# Patient Record
Sex: MALE | Race: WHITE | Employment: FULL TIME | URBAN - METROPOLITAN AREA
[De-identification: names, ages, dates, MRNs, and addresses within clinical notes are randomized per-mention and may not be internally consistent; named-entity substitution may affect disease eponyms.]

---

## 2018-06-13 PROBLEM — T39.1X1A OVERDOSE BY ACETAMINOPHEN: Status: ACTIVE | Noted: 2018-06-13

## 2018-11-13 ENCOUNTER — OFFICE VISIT (OUTPATIENT)
Dept: FAMILY MEDICINE CLINIC | Age: 53
End: 2018-11-13

## 2018-11-13 VITALS
HEART RATE: 85 BPM | WEIGHT: 188 LBS | TEMPERATURE: 98.1 F | DIASTOLIC BLOOD PRESSURE: 92 MMHG | BODY MASS INDEX: 24.13 KG/M2 | SYSTOLIC BLOOD PRESSURE: 154 MMHG | OXYGEN SATURATION: 97 % | RESPIRATION RATE: 18 BRPM | HEIGHT: 74 IN

## 2018-11-13 DIAGNOSIS — M54.42 CHRONIC BILATERAL LOW BACK PAIN WITH LEFT-SIDED SCIATICA: ICD-10-CM

## 2018-11-13 DIAGNOSIS — I10 ESSENTIAL HYPERTENSION: ICD-10-CM

## 2018-11-13 DIAGNOSIS — R53.82 CHRONIC FATIGUE: ICD-10-CM

## 2018-11-13 DIAGNOSIS — M51.26 LUMBAGO DUE TO DISPLACEMENT OF INTERVERTEBRAL DISC: Primary | ICD-10-CM

## 2018-11-13 DIAGNOSIS — G89.29 CHRONIC BILATERAL LOW BACK PAIN WITH LEFT-SIDED SCIATICA: ICD-10-CM

## 2018-11-13 RX ORDER — QUETIAPINE FUMARATE 25 MG/1
1 TABLET, FILM COATED ORAL DAILY
Refills: 1 | COMMUNITY
Start: 2018-11-08

## 2018-11-13 RX ORDER — METHYLPREDNISOLONE 4 MG/1
TABLET ORAL
Qty: 1 DOSE PACK | Refills: 0 | Status: SHIPPED | OUTPATIENT
Start: 2018-11-13

## 2018-11-13 RX ORDER — BUPROPION HYDROCHLORIDE 300 MG/1
1 TABLET ORAL DAILY
Refills: 0 | COMMUNITY
Start: 2018-10-18

## 2018-11-13 RX ORDER — BUSPIRONE HYDROCHLORIDE 15 MG/1
1 TABLET ORAL DAILY
Refills: 4 | COMMUNITY
Start: 2018-11-07

## 2018-11-13 RX ORDER — NAPROXEN 500 MG/1
500 TABLET ORAL 2 TIMES DAILY WITH MEALS
Qty: 30 TAB | Refills: 0 | Status: SHIPPED | OUTPATIENT
Start: 2018-11-13

## 2018-11-13 RX ORDER — MIRTAZAPINE 30 MG/1
1 TABLET, FILM COATED ORAL DAILY
Refills: 4 | COMMUNITY
Start: 2018-11-07

## 2018-11-13 RX ORDER — SERTRALINE HYDROCHLORIDE 100 MG/1
1 TABLET, FILM COATED ORAL DAILY
Refills: 2 | COMMUNITY
Start: 2018-10-01

## 2018-11-13 NOTE — PATIENT INSTRUCTIONS
Learning About Relief for Back Pain What is back tension and strain? Back strain happens when you overstretch, or pull, a muscle in your back. You may hurt your back in an accident or when you exercise or lift something. Most back pain will get better with rest and time. You can take care of yourself at home to help your back heal. 
What can you do first to relieve back pain? When you first feel back pain, try these steps: 
· Walk. Take a short walk (10 to 20 minutes) on a level surface (no slopes, hills, or stairs) every 2 to 3 hours. Walk only distances you can manage without pain, especially leg pain. · Relax. Find a comfortable position for rest. Some people are comfortable on the floor or a medium-firm bed with a small pillow under their head and another under their knees. Some people prefer to lie on their side with a pillow between their knees. Don't stay in one position for too long. · Try heat or ice. Try using a heating pad on a low or medium setting, or take a warm shower, for 15 to 20 minutes every 2 to 3 hours. Or you can buy single-use heat wraps that last up to 8 hours. You can also try an ice pack for 10 to 15 minutes every 2 to 3 hours. You can use an ice pack or a bag of frozen vegetables wrapped in a thin towel. There is not strong evidence that either heat or ice will help, but you can try them to see if they help. You may also want to try switching between heat and cold. · Take pain medicine exactly as directed. ? If the doctor gave you a prescription medicine for pain, take it as prescribed. ? If you are not taking a prescription pain medicine, ask your doctor if you can take an over-the-counter medicine. What else can you do? · Stretch and exercise. Exercises that increase flexibility may relieve your pain and make it easier for your muscles to keep your spine in a good, neutral position. And don't forget to keep walking. · Do self-massage. You can use self-massage to unwind after work or school or to energize yourself in the morning. You can easily massage your feet, hands, or neck. Self-massage works best if you are in comfortable clothes and are sitting or lying in a comfortable position. Use oil or lotion to massage bare skin. · Reduce stress. Back pain can lead to a vicious [place]: Distress about the pain tenses the muscles in your back, which in turn causes more pain. Learn how to relax your mind and your muscles to lower your stress. Where can you learn more? Go to http://arnulfo-renetta.info/. Enter N892 in the search box to learn more about \"Learning About Relief for Back Pain. \" Current as of: November 29, 2017 Content Version: 11.8 © 5176-2682 [employer]. Care instructions adapted under license by [employer] (which disclaims liability or warranty for this information). If you have questions about a medical condition or this instruction, always ask your healthcare professional. [address] any warranty or liability for your use of this information.
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
## 2018-11-13 NOTE — PROGRESS NOTES
{[facility] Summary:[id number]} Key Psychotherapeutic Meds   
    
  
 traZODone (DESYREL) 150 mg tablet Take 150 mg by mouth nightly. sertraline (ZOLOFT) 50 mg tablet Take 1 Tab by mouth daily. Other [address] [place] [address] The patient is on no other behavioral health meds. Lab Results Component Value Date/Time  Sodium 142 10/01/2018 02:25 PM  
 Creatinine 0.9 10/01/2018 02:25 PM  
 WBC 6.6 10/01/2018 02:25 PM  
 ALT (SGPT) 18 06/14/2018 08:01 AM  
 AST (SGOT) 9 06/14/2018 08:01 AM

## 2018-11-13 NOTE — PROGRESS NOTES
Patient is here to establish care with with pcp. 1. Have you been to the ER, urgent care clinic since your last visit? Hospitalized since your last visit? yes, suicide attempt 2. Have you seen or consulted any other health care providers outside of the [address] [place] [address] since your last visit? Include any pap smears or colon screening.  no

## 2018-11-14 RX ORDER — AMLODIPINE BESYLATE 10 MG/1
10 TABLET ORAL DAILY
Qty: 30 TAB | Refills: 1 | Status: SHIPPED | OUTPATIENT
Start: 2018-11-14

## 2018-11-23 ENCOUNTER — HOSPITAL ENCOUNTER (OUTPATIENT)
Dept: LAB | Age: 53
Discharge: HOME | End: 2018-11-23
Payer: MEDICAID

## 2018-11-23 DIAGNOSIS — R53.82 CHRONIC FATIGUE: ICD-10-CM

## 2018-11-23 DIAGNOSIS — F33.2 SEVERE RECURRENT MAJOR DEPRESSION WITHOUT PSYCHOTIC FEATURES (HCC): Primary | ICD-10-CM

## 2018-11-23 DIAGNOSIS — Z79.899 ENCOUNTER FOR LONG-TERM (CURRENT) DRUG USE: ICD-10-CM

## 2018-11-23 DIAGNOSIS — F33.2 SEVERE RECURRENT MAJOR DEPRESSION WITHOUT PSYCHOTIC FEATURES (HCC): ICD-10-CM

## 2018-11-23 DIAGNOSIS — I10 ESSENTIAL HYPERTENSION: ICD-10-CM

## 2018-11-23 LAB
BASOPHILS # BLD: 0 K/UL (ref 0–0.1)
BASOPHILS NFR BLD: 1 % (ref 0–2)
CHOLEST SERPL-MCNC: 249 MG/DL
DIFFERENTIAL METHOD BLD: ABNORMAL
EOSINOPHIL # BLD: 0.3 K/UL (ref 0–0.4)
EOSINOPHIL NFR BLD: 3 % (ref 0–5)
ERYTHROCYTE [DISTWIDTH] IN BLOOD BY AUTOMATED COUNT: 14.1 % (ref 11.6–14.5)
EST. AVERAGE GLUCOSE BLD GHB EST-MCNC: 111 MG/DL
FERRITIN SERPL-MCNC: 70 NG/ML (ref 8–388)
HBA1C MFR BLD: 5.5 % (ref 4.2–5.6)
HCT VFR BLD AUTO: 46.4 % (ref 36–48)
HDLC SERPL-MCNC: 30 MG/DL (ref 40–60)
HDLC SERPL: 8.3 {RATIO} (ref 0–5)
HGB BLD-MCNC: 15.2 G/DL (ref 13–16)
IRON SATN MFR SERPL: 29 %
IRON SERPL-MCNC: 70 UG/DL (ref 50–175)
LDLC SERPL CALC-MCNC: ABNORMAL MG/DL (ref 0–100)
LIPID PROFILE,FLP: ABNORMAL
LYMPHOCYTES # BLD: 2.8 K/UL (ref 0.9–3.6)
LYMPHOCYTES NFR BLD: 32 % (ref 21–52)
MCH RBC QN AUTO: 28.3 PG (ref 24–34)
MCHC RBC AUTO-ENTMCNC: 32.8 G/DL (ref 31–37)
MCV RBC AUTO: 86.4 FL (ref 74–97)
MONOCYTES # BLD: 1 K/UL (ref 0.05–1.2)
MONOCYTES NFR BLD: 12 % (ref 3–10)
NEUTS SEG # BLD: 4.7 K/UL (ref 1.8–8)
NEUTS SEG NFR BLD: 52 % (ref 40–73)
PLATELET # BLD AUTO: 227 K/UL (ref 135–420)
PMV BLD AUTO: 10.2 FL (ref 9.2–11.8)
RBC # BLD AUTO: 5.37 M/UL (ref 4.7–5.5)
TIBC SERPL-MCNC: 244 UG/DL (ref 250–450)
TRIGL SERPL-MCNC: 517 MG/DL (ref ?–150)
TSH SERPL DL<=0.05 MIU/L-ACNC: 2.1 UIU/ML (ref 0.36–3.74)
VIT B12 SERPL-MCNC: 532 PG/ML (ref 211–911)
VLDLC SERPL CALC-MCNC: ABNORMAL MG/DL
WBC # BLD AUTO: 8.8 K/UL (ref 4.6–13.2)

## 2018-11-23 PROCEDURE — 83036 HEMOGLOBIN GLYCOSYLATED A1C: CPT

## 2018-11-23 PROCEDURE — 84403 ASSAY OF TOTAL TESTOSTERONE: CPT

## 2018-11-23 PROCEDURE — 84443 ASSAY THYROID STIM HORMONE: CPT

## 2018-11-23 PROCEDURE — 86803 HEPATITIS C AB TEST: CPT

## 2018-11-23 PROCEDURE — 82728 ASSAY OF FERRITIN: CPT

## 2018-11-23 PROCEDURE — 85025 COMPLETE CBC W/AUTO DIFF WBC: CPT

## 2018-11-23 PROCEDURE — 82607 VITAMIN B-12: CPT

## 2018-11-23 PROCEDURE — 82652 VIT D 1 25-DIHYDROXY: CPT

## 2018-11-23 PROCEDURE — 83540 ASSAY OF IRON: CPT

## 2018-11-23 PROCEDURE — 80061 LIPID PANEL: CPT

## 2018-11-25 LAB
TESTOST FREE SERPL-MCNC: 4.3 PG/ML (ref 7.2–24)
TESTOST SERPL-MCNC: 162 NG/DL (ref 264–916)

## 2018-11-26 LAB
HCV AB SER IA-ACNC: 0.17 INDEX
HCV AB SERPL QL IA: NEGATIVE
HCV COMMENT,HCGAC: NORMAL

## 2018-11-27 LAB
1,25(OH)2D3 SERPL-MCNC: 23.8 PG/ML (ref 19.9–79.3)
TESTOST FREE SERPL-MCNC: NORMAL PG/ML
TESTOST SERPL-MCNC: NORMAL NG/DL

## 2018-11-30 LAB
AMPHETAMINES UR QL SCN: NEGATIVE NG/ML
BARBITURATES UR QL SCN: NEGATIVE NG/ML
BENZODIAZ UR QL: NEGATIVE NG/ML
BZE UR QL: NEGATIVE NG/ML
CANNABINOIDS UR QL SCN: NEGATIVE NG/ML
OPIATES UR QL: NEGATIVE NG/ML
PCP UR QL: NEGATIVE NG/ML
SPECIMEN STATUS REPORT, ROLRST: NORMAL

## 2018-12-06 ENCOUNTER — TELEPHONE (OUTPATIENT)
Dept: FAMILY MEDICINE CLINIC | Age: 53
End: 2018-12-06

## 2018-12-06 NOTE — LETTER
12/14/2018 4:11 PM 
 
Mr. Winifred Griffiths 
[address] [facility] [address] [address] Mr Josh Steiner, We have attempted to contact you by phone without success. Please contact our office to schedule an appointment to discuss your lab results.  
 
 
Sincerely, 
 
 
Miguel Sher PA-C

## 2018-12-06 NOTE — TELEPHONE ENCOUNTER
Please call patient to schedule an appointment to review labs. He had abnormalities that need to be reviewed. Thank you.

## 2018-12-06 NOTE — TELEPHONE ENCOUNTER
Left message for patient to call back. Need to have patient schedule appt to discuss lab results with provider.

## 2018-12-07 NOTE — TELEPHONE ENCOUNTER
Left another message for patient to call back. Patient needs to schedule appt with provider to discuss lab results.

## 2018-12-11 NOTE — TELEPHONE ENCOUNTER
Left another message for patient to call back. Need patient to schedule appt to discuss lab results with provider.

## 2018-12-14 NOTE — TELEPHONE ENCOUNTER
After many attempts to contact patient by phone without success and no return calls, a letter will be mailed to patient to request that he contact the office to schedule an appt to discuss lab results.

## 2019-03-27 ENCOUNTER — HOSPITAL ENCOUNTER (OUTPATIENT)
Dept: PHYSICAL THERAPY | Age: 54
Discharge: HOME | End: 2019-03-27
Payer: MEDICAID

## 2019-03-27 PROCEDURE — 97162 PT EVAL MOD COMPLEX 30 MIN: CPT

## 2019-03-27 PROCEDURE — 97110 THERAPEUTIC EXERCISES: CPT

## 2019-03-27 PROCEDURE — 97530 THERAPEUTIC ACTIVITIES: CPT

## 2019-03-27 NOTE — PROGRESS NOTES
PHYSICAL THERAPY - DAILY TREATMENT NOTE Patient Name: Angie Barrientos        Date: 3/27/2019 : 1965   YES Patient  Verified Visit #:   1   of   10  Insurance: Payor: MEDICAID [facility] / Plan: 1500 / Product Type: Medicaid / In time: 10:05 Out time: 11:13 Total Treatment Time: 76 BCBS and Medicare Time Tracking (below) Total Timed Codes (min):  34 1:1 Treatment Time:  34 TREATMENT AREA =  Lumbago [M54.5] SUBJECTIVE Pain Level (on 0 to 10 scale):  7-8  / 10 Medication Changes/New allergies or changes in medical history, any new surgeries or procedures? NO    If yes, update Summary List  
Subjective Functional Status/Changes:  []  No changes reported See POC OBJECTIVE 9 min Therapeutic Exercise:  [x]  See flow sheet  
[]  Other:     
[x]  Added:  Repeated L SG at wall  
to improve (function):   
[]  Changed:    
Rationale: To increase ROM, flexibility, and increase strength to improve the patients ability to wake in the morning 25 min Therapeutic Activity: Education anatomy and physiology of the lumbar spine. Common causes of LBP. Centralization and peripheralization principals. Affect of posture with hips off center on the mechanics of the spine. Importance of maintaining lordosis with sitting, standing, and sleeping posture. Home program of Repeated L SG 10-20 repetitions, every 2-3 hours for management of L LE L/S sx Rationale:    Improve positioning of L/S to improve the patients ability to tolerate prolonged sitting.  min Patient Education:  YES  Reviewed HEP []  Progressed/Changed HEP based on: Other Objective/Functional Measures: 
 Physical Therapy Evaluation - Lumbar Spine ([facility]) SUBJECTIVE Aggravated by: 
 [x] Bending [x] Sitting [x] Standing [] Walking 
 [] Moving [] Cough [] Sneeze [] Valsalva [x] AM  [] PM  Lying:  [] sup   [] pro   [x] sidelying Eased by:  
 [] Bending [] Sitting [] Standing [x] Walking [x] Moving [] AM  [] PM  Lying: [] sup  [] pro  [] sidelying General Health:  Red Flags Indicated? [] Yes    [x] No 
[] Yes [x] No Recent weight change (If yes, due to dieting? [] Yes  [] No) [x] Yes [] No Weakness in legs during walking - Intermittent L knee giving way 
[] Yes [x] No Unremitting pain at night 
[] Yes [x] No Abdominal pain or problems 
[] Yes [x] No Rectal bleeding 
[] Yes [x] No Blood or pain with urination 
[] Yes [x] No Dysfunction of bowel or bladder 
[] Yes [x] No Numbness/tingling in buttock/genitalia region Past History/Treatments:  
 
Diagnostic Tests: [] Lab work [] X-rays    [] CT [x] MRI     [] Other: 
Results: L4/S1 L LE HNP Justification for Eval Complexity:  
Patient History: HIGH Complexity :3+ comorbidities / personal factors will impact the outcome/ POC  (See POC for list of comorbidities) Examination:HIGH Complexity : 4+ Standardized tests and measures addressing body structure, function, activity limitation and / or participation in recreation  (See POC and musculoskeletal examination attached) Clinical Presentation: MEDIUM Complexity : Evolving with changing characteristics  (pain level 7-8/10 on average and 10/10 @ worst, nothing makes pain better, L LE N/T, constant pain) Clinical Decision Making:MEDIUM Complexity : FOTO score of 26-74 (Foto 38/100) Overall Complexity:MEDIUM Post Treatment Pain Level (on 0 to 10) scale:   6  / 10 ASSESSMENT Assessment/Changes in Function:  
 
See POC []  See Progress Note/Recertification Patient will continue to benefit from skilled PT services to modify and progress therapeutic interventions, address functional mobility deficits, address ROM deficits, address strength deficits, analyze and address soft tissue restrictions, analyze and cue movement patterns, analyze and modify body mechanics/ergonomics and assess and modify postural abnormalities to attain remaining goals. Progress toward goals / Updated goals: See POC PLAN [x]  Upgrade activities as tolerated YES Continue plan of care  
[]  Discharge due to :   
[]  Other:   
 
Therapist: Juan Palmer DPT Date: 3/27/2019 Time: 11:22 AM  
 
Future Appointments Date Time Provider Christelle Perkins 4/3/2019  8:30 AM Johanny Sandoval [facility]  
4/4/2019  1:45 PM Emanuel Madden, MONI [facility]  
4/9/2019 12:00 PM Emanuel Madden PTA [facility]  
4/11/2019  5:00 PM Arnel Sanchez, PT [facility]  
4/16/2019  1:30 PM [facility] PT MARCELINA 1 [facility]  
4/18/2019  2:00 PM [facility] PT MARCELINA 1 [facility]
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
## 2019-04-03 ENCOUNTER — HOSPITAL ENCOUNTER (OUTPATIENT)
Dept: PHYSICAL THERAPY | Age: 54
Discharge: HOME | End: 2019-04-03
Payer: MEDICAID

## 2019-04-03 PROCEDURE — 97140 MANUAL THERAPY 1/> REGIONS: CPT

## 2019-04-03 PROCEDURE — 97110 THERAPEUTIC EXERCISES: CPT

## 2019-04-03 NOTE — PROGRESS NOTES
PHYSICAL THERAPY - DAILY TREATMENT NOTE Patient Name: Delmer Glass        Date: 4/3/2019 : 1965   YES Patient  Verified Visit #:   2   of   10  Insurance: Payor: MEDICAID [place] / Plan: Laura Barron / Product Type: Medicaid / In time: 8:45 Out time: 9:22 Total Treatment Time: 37 BCBS and Medicare Time Tracking (below) Total Timed Codes (min):  n/a 1:1 Treatment Time:  n/a  
TREATMENT AREA =  Lumbago [M54.5] SUBJECTIVE Pain Level (on 0 to 10 scale):  8  / 10 Medication Changes/New allergies or changes in medical history, any new surgeries or procedures? NO    If yes, update Summary List  
Subjective Functional Status/Changes:  []  No changes reported Pt states \"sitting is more difficult and sleeping has gotten much worse last night after work it felt ok, and then last night after work it felt good\" OBJECTIVE 29 min Therapeutic Exercise:  [x]  See flow sheet  
[]  Other:     
[x]  Added:  L SG at wall no OP  
to improve (function):   
[]  Changed:    
Rationale: To increase ROM, flexibility, and increase strength to improve the patients ability to perform standing ADLs 8 min Manual Therapy: Trigger point release to L piriformis in prone lying Rationale:      decrease pain, increase ROM, increase tissue extensibility and decrease trigger points in L/S to improve patient's ability to tolerate prolonged standing. min Patient Education:  YES  Reviewed HEP []  Progressed/Changed HEP based on: Other Objective/Functional Measures: L/S & LE MMT/AROM Pre Tx Post L SG at wall Pain location/sx L LBP and buttock and N/T L LBP  
DF MMT 4 NT Forward flexion  50% NT Extension  <25% NT  
R/L SB 40%/65% 50/50% R/L Rotation 75/75% 95%/100% R/L Sideglide 50/25% NT Post Treatment Pain Level (on 0 to 10) scale:   7  / 10 ASSESSMENT Assessment/Changes in Function: Pt presented with increased TTP and mm tone in L piriformis.  S/P repeated SG at wall L/S AROM increased in rotation and sx in L thigh/buttock to L Low back decr L GT N/T. VC for feet closer to wall and NO OP with L hand and SG ROM to tolerance not pain. []  See Progress Note/Recertification Patient will continue to benefit from skilled PT services to modify and progress therapeutic interventions, address functional mobility deficits, address ROM deficits, address strength deficits, analyze and address soft tissue restrictions, analyze and cue movement patterns, analyze and modify body mechanics/ergonomics and assess and modify postural abnormalities to attain remaining goals. Progress toward goals / Updated goals: 
First visit after initial evaluation. Progress tx per POC. PLAN [x]  Upgrade activities as tolerated YES Continue plan of care  
[]  Discharge due to :   
[]  Other:   
 
Therapist: Kyaw Baker DPT Date: 4/3/2019 Time: 8:28 AM  
 
Future Appointments Date Time Provider Christelle Perkins 4/3/2019  8:30 AM Luz Guerra [facility]  
4/4/2019  1:45 PM Tami Tracey PTA [facility]  
4/9/2019 12:00 PM Tami Tracey PTA [facility]  
4/11/2019  5:00 PM Ruby Paget, PT [facility]  
4/16/2019  1:30 PM [facility] PT [place] Cornelius [facility]  
4/18/2019  2:00 PM [facility] PT [place] 1 [facility]

## 2019-04-04 ENCOUNTER — HOSPITAL ENCOUNTER (OUTPATIENT)
Dept: PHYSICAL THERAPY | Age: 54
Discharge: HOME | End: 2019-04-04
Payer: MEDICAID

## 2019-04-04 PROCEDURE — 97530 THERAPEUTIC ACTIVITIES: CPT

## 2019-04-04 PROCEDURE — 97140 MANUAL THERAPY 1/> REGIONS: CPT

## 2019-04-04 PROCEDURE — 97110 THERAPEUTIC EXERCISES: CPT

## 2019-04-04 PROCEDURE — 97014 ELECTRIC STIMULATION THERAPY: CPT

## 2019-04-04 NOTE — PROGRESS NOTES
PHYSICAL THERAPY - DAILY TREATMENT NOTE Patient Name: Juan M Govea        Date: 2019 : 1965   YES Patient  Verified Visit #:   3   of   10  Insurance: Payor: MEDICAID [facility] / Plan: [id number] / Product Type: Medicaid / In time: 2:15 pm Out time: 2:58 pm  
Total Treatment Time: 37 Medicare Time /BCBS Tracking (below) Total Timed Codes (min):  na 1:1 Treatment Time:  na  
TREATMENT AREA =  Lumbago [M54.5] SUBJECTIVE Pain Level (on 0 to 10 scale):  8  / 10- left hip to foot Medication Changes/New allergies or changes in medical history, any new surgeries or procedures? NO    If yes, update Summary List  
Subjective Functional Status/Changes:  []  No changes reported Pt reports HEP at times helped pain . Earlier today foot pain not as bad and then I sat down. Actually walking without too much difficulty. OBJECTIVE Modalities Rationale:     decrease edema, decrease inflammation, decrease pain and increase tissue extensibility to improve patient's ability to perform functional ADLs 10 min [x] Estim, type/location: Prone lying over 2 pillows IFC low back, post hip   
                                 []  att     [x]  unatt     []  w/US     [x]  w/ice    []  w/heat 
 min []  Mechanical Traction: type/lbs   
               []  pro   []  sup   []  int   []  cont    []  before manual    []  after manual  
 min []  Ultrasound, settings/location:    
 min []  Iontophoresis w/ dexamethasone, location:   
                                           []  take home patch       []  in clinic  
 min []  Ice     []  Heat    location/position:   
 min []  Vasopneumatic Device, press/temp:   
 min []  Other:   
[x] Skin assessment post-treatment (if applicable):   
[x]  intact    []  redness- no adverse reaction    
[]redness  adverse reaction:     
15 min Therapeutic Exercise:  [x]  See flow sheet Rationale:      increase ROM, increase strength and improve coordination to improve the patients ability to perform prolonged sitting 9 min Manual Therapy: Prone lying over 2 pillows left piriformis release; DTM to lower lumbar paraspinals Rationale:      decrease pain, increase ROM, increase tissue extensibility and decrease trigger points to improve patient's ability to perform prolonged sitting 9 min Therapeutic Activity: Pt education in use of positioning for sleeping, sitting, supine to sit and log roll body mechanics Rationale:    increase ROM and increase strength to improve the patients ability to perform prolonged sitting  
 
 
 min Patient Education:  Juancarlos Quinn []  Progressed/Changed HEP based on: Other Objective/Functional Measures: 
 
Review HEP, pt education in positioning, body mechanics for pain management Post Treatment Pain Level (on 0 to 10) scale:   6  / 10- L LE above knee ASSESSMENT Assessment/Changes in Function:  
Pt reporting pain decreased with use of left side glides on wall. Pt able to centralize L LE symptoms to above knee with prone lying of 2 pillows and right hip shift . Extensive pt education in self monitoring of symptoms for centralization with HEP and positioning. Add IFC with ice to decrease pain and inflammation. []  See Progress Note/Recertification Patient will continue to benefit from skilled PT services to modify and progress therapeutic interventions, address functional mobility deficits, address ROM deficits, address strength deficits, analyze and address soft tissue restrictions, assess and modify postural abnormalities and instruct in home and community integration to attain remaining goals. Progress toward goals / Updated goals: 
1) Establish HEP. -goal in progress 2) Patient will report decreased c/o pain to < or = 7/10 at the worst to facilitate prolonged sitting with manageable sx in L/S.goal in progress- 
 3) Patient will report 25% improvement in ability to rise in the morning. 4) Patient will report 25% improvement in left LE radicular symptoms in order to facilitate ease with prolonged sitting PLAN 
[]  Upgrade activities as tolerated YES Continue plan of care  
[]  Discharge due to :   
[]  Other:   
 
Therapist: Evan Steiner PTA Date: 4/4/2019 Time: 2:58 PM  
 
Future Appointments Date Time Provider Christelle Perkins 4/9/2019 12:00 PM Emir Boyer PTA [facility]  
4/11/2019  5:00 PM Charity Celeste PT [facility]  
4/16/2019  1:30 PM [facility] PT [place] 1 [facility]  
4/18/2019  2:00 PM [facility] PT [place] 1 [facility]

## 2019-04-09 ENCOUNTER — HOSPITAL ENCOUNTER (OUTPATIENT)
Dept: PHYSICAL THERAPY | Age: 54
Discharge: HOME | End: 2019-04-09
Payer: MEDICAID

## 2019-04-09 PROCEDURE — 97110 THERAPEUTIC EXERCISES: CPT

## 2019-04-09 PROCEDURE — 97530 THERAPEUTIC ACTIVITIES: CPT

## 2019-04-09 PROCEDURE — 97014 ELECTRIC STIMULATION THERAPY: CPT

## 2019-04-09 PROCEDURE — 97140 MANUAL THERAPY 1/> REGIONS: CPT

## 2019-04-09 NOTE — PROGRESS NOTES
PHYSICAL THERAPY - DAILY TREATMENT NOTE Patient Name: Anand Calrk        Date: 2019 : 1965   YES Patient  Verified Visit #:  4 of   10  Insurance: Payor: [facility] MEDICAID / Plan: [place] [employer] [place] [employer] / Product Type: Managed Care Medicaid / In time: 12:15 pm Out time: 1:14  pm  
Total Treatment Time: 61 Medicare Time /BCBS Tracking (below) Total Timed Codes (min):  na 1:1 Treatment Time:  na  
TREATMENT AREA =  Lumbago [M54.5] SUBJECTIVE Pain Level (on 0 to 10 scale): -7  / 10- left hip to foot Medication Changes/New allergies or changes in medical history, any new surgeries or procedures? NO    If yes, update Summary List  
Subjective Functional Status/Changes:  []  No changes reported Patient reports he feels good in am and pain gets worse through the day. Yesterday was the worst day I've had in awhile. When I woke up I was fine and then I was just standing there and the pain was really bad. It didn't settle down until that night and it bothered me a little last night. OBJECTIVE Modalities Rationale:     decrease edema, decrease inflammation, decrease pain and increase tissue extensibility to improve patient's ability to perform functional ADLs 10 min [x] Estim, type/location:Prone lying over 1 pillows IFC low back, post hip   
                                 []  att     [x]  unatt     []  w/US     [x]  w/ice    []  w/heat 
 min []  Mechanical Traction: type/lbs   
               []  pro   []  sup   []  int   []  cont    []  before manual    []  after manual  
 min []  Ultrasound, settings/location:    
 min []  Iontophoresis w/ dexamethasone, location:   
                                           []  take home patch       []  in clinic  
 min []  Ice     []  Heat    location/position:   
 min []  Vasopneumatic Device, press/temp:   
 min []  Other:   
[x] Skin assessment post-treatment (if applicable):   
[x]  intact    []  redness- no adverse reaction []redness  adverse reaction:     
28/ 18 min billed min Therapeutic Exercise:  [x]  See flow sheet Rationale:      increase ROM, increase strength and improve coordination to improve the patients ability to perform prolonged sitting 12 min Manual Therapy: Prone lying over 1 pillow left piriformis release; DTM to lower lumbar paraspinals; corrected right posterior innominate with MET Rationale:      decrease pain, increase ROM, increase tissue extensibility and decrease trigger points to improve patient's ability to perform prolonged sitting 9 min Therapeutic Activity: Reviewed with patient  use of positioning for sleeping, sitting, supine to sit and log roll body mechanics Rationale:    increase ROM and increase strength to improve the patients ability to perform prolonged sitting  
 
 
 min Patient Education:  Unk Good []  Progressed/Changed HEP based on: Other Objective/Functional Measures: 
 
Review HEP, pt education in positioning, body mechanics for pain management Post Treatment Pain Level (on 0 to 10) scale:   3-4  / 10- pain LB with mild L LE radicular symptoms ASSESSMENT Assessment/Changes in Function:  
Pt able to correct right lateral shift with 3 sets of 10 left side glides. Unable to centralize in standing. Prone lying over 1 pillow with right hip shift centralizing pain into left hip. Pt able to centralize pain to low back after treatment with mild L LE symptoms reported. []  See Progress Note/Recertification Patient will continue to benefit from skilled PT services to modify and progress therapeutic interventions, address functional mobility deficits, address ROM deficits, address strength deficits, analyze and address soft tissue restrictions, assess and modify postural abnormalities and instruct in home and community integration to attain remaining goals. Progress toward goals / Updated goals: 
1) Establish HEP. -goal in progress 2) Patient will report decreased c/o pain to < or = 7/10 at the worst to facilitate prolonged sitting with manageable sx in L/S.goal in progress- 
3) Patient will report 25% improvement in ability to rise in the morning. 4) Patient will report 25% improvement in left LE radicular symptoms in order to facilitate ease with prolonged sitting- slow progress toward goal 
 
 
 
PLAN 
[]  Upgrade activities as tolerated YES Continue plan of care  
[]  Discharge due to :   
[]  Other:   
 
Therapist: Margoth Cherry PTA Date: 4/9/2019 Time: 1:14  PM  
 
Future Appointments Date Time Provider Christelle Perkins 4/11/2019  5:00 PM Hollie Johnson, PT [facility] [address]  
4/16/2019  1:30 PM [facility] PT [place] 1 [facility]  
4/18/2019  2:00 PM [facility] PT [place] 1 [facility]

## 2019-04-11 ENCOUNTER — HOSPITAL ENCOUNTER (OUTPATIENT)
Dept: PHYSICAL THERAPY | Age: 54
Discharge: HOME | End: 2019-04-11
Payer: MEDICAID

## 2019-04-11 PROCEDURE — 97110 THERAPEUTIC EXERCISES: CPT

## 2019-04-11 PROCEDURE — 97140 MANUAL THERAPY 1/> REGIONS: CPT

## 2019-04-11 PROCEDURE — 97014 ELECTRIC STIMULATION THERAPY: CPT

## 2019-04-11 NOTE — PROGRESS NOTES
PHYSICAL THERAPY - DAILY TREATMENT NOTE Patient Name: Nikky Monsalve        Date: 2019 : 1965   YES Patient  Verified Visit #:     10  Insurance: Payor: [facility] MEDICAID / Plan: [place] [employer] [place] [employer] / Product Type: Managed Care Medicaid / In time: 5:04 pm Out time: 5:56 pm  
Total Treatment Time: 46 Medicare Time /BCBS Tracking (below) Total Timed Codes (min):  na 1:1 Treatment Time:  na  
TREATMENT AREA =  Lumbago [M54.5] SUBJECTIVE Pain Level (on 0 to 10 scale): 5 / 10 Medication Changes/New allergies or changes in medical history, any new surgeries or procedures? NO    If yes, update Summary List  
Subjective Functional Status/Changes:  []  No changes reported Patient reports pain in low back and L ankle. Had an episode earlier today of pins and needles entire leg down to the lasting 1-2 minutes. Sat on the bus 45 minutes 7-8/10 when first got on bus and decreased to 4/10 at the end of the ride. OBJECTIVE Modalities Rationale:     decrease edema, decrease inflammation, decrease pain and increase tissue extensibility to improve patient's ability to perform functional ADLs 10 min [x] Estim, type/location:Prone lying over 1 pillows IFC low back, post hip   
                                 []  att     [x]  unatt     []  w/US     [x]  w/ice    []  w/heat 
 min []  Mechanical Traction: type/lbs   
               []  pro   []  sup   []  int   []  cont    []  before manual    []  after manual  
 min []  Ultrasound, settings/location:    
 min []  Iontophoresis w/ dexamethasone, location:   
                                           []  take home patch       []  in clinic  
 min []  Ice     []  Heat    location/position:   
 min []  Vasopneumatic Device, press/temp:   
 min []  Other:   
[x] Skin assessment post-treatment (if applicable):   
[x]  intact    []  redness- no adverse reaction    
[]redness  adverse reaction: 32 min Therapeutic Exercise:  [x]  See flow sheet Rationale:      increase ROM, increase strength and improve coordination to improve the patients ability to perform prolonged sitting 10 min Manual Therapy: Prone lying over 1 pillow left piriformis release; DTM to lower lumbar paraspinals; corrected left posterior innominate with MET Rationale:      decrease pain, increase ROM, increase tissue extensibility and decrease trigger points to improve patient's ability to perform prolonged sitting  
na min Therapeutic Activity: na  
Rationale:    increase ROM and increase strength to improve the patients ability to perform prolonged sitting  
 
 
 min Patient Education:  YES  Reviewed HEP []  Progressed/Changed HEP based on:  Continue present HEP Other Objective/Functional Measures: 
 
Seated lumbar flexion increased pain to dorsum of left foot. Added prone to elbows and SB 2,3. Post Treatment Pain Level (on 0 to 10) scale:   2-3/10 ASSESSMENT Assessment/Changes in Function:  
Pain centralized to L low back with L side glides and prone lying. Patient continues to respond to extension based positions and movements. Slowly progressing core strengthening. []  See Progress Note/Recertification Patient will continue to benefit from skilled PT services to modify and progress therapeutic interventions, address functional mobility deficits, address ROM deficits, address strength deficits, analyze and address soft tissue restrictions, assess and modify postural abnormalities and instruct in home and community integration to attain remaining goals. Progress toward goals / Updated goals: 
1) Establish HEP. -goal in progress 2) Patient will report decreased c/o pain to < or = 7/10 at the worst to facilitate prolonged sitting with manageable sx in L/S.goal in progress- 
3) Patient will report 25% improvement in ability to rise in the morning. 4) Patient will report 25% improvement in left LE radicular symptoms in order to facilitate ease with prolonged sitting- slow progress toward goal 
 
 
 
PLAN [x]  Upgrade activities as tolerated YES Continue plan of care  
[]  Discharge due to :   
[]  Other:   
 
Therapist: Sohan Gruber PT Date: 4/11/2019 Time: 5:56 PM  
 
Future Appointments Date Time Provider Christelle Perkins 4/11/2019  5:00 PM Hollie Johnson, PT [facility]  
4/16/2019  1:30 PM [facility] PT [place] 1 [facility]  
4/18/2019  2:00 PM [facility] PT [place] 1 [facility]

## 2019-04-16 ENCOUNTER — HOSPITAL ENCOUNTER (OUTPATIENT)
Dept: PHYSICAL THERAPY | Age: 54
Discharge: HOME | End: 2019-04-16
Payer: MEDICAID

## 2019-04-16 PROCEDURE — 97014 ELECTRIC STIMULATION THERAPY: CPT

## 2019-04-16 PROCEDURE — 97110 THERAPEUTIC EXERCISES: CPT

## 2019-04-16 PROCEDURE — 97140 MANUAL THERAPY 1/> REGIONS: CPT

## 2019-04-16 NOTE — PROGRESS NOTES
PHYSICAL THERAPY - DAILY TREATMENT NOTE Patient Name: Jose Roberto Temple        Date: 2019 : 1965   YES Patient  Verified Visit #:   6   of   10  Insurance: Payor: [employer] MEDICAID / Plan: [place] [employer] [place] [employer] / Product Type: Managed Care Medicaid / In time: 1:35 Out time: 2:43 Total Treatment Time: 76 BCBS and Medicare Time Tracking (below) Total Timed Codes (min):  58 1:1 Treatment Time:  46 TREATMENT AREA =  Lumbago [M54.5] SUBJECTIVE Pain Level (on 0 to 10 scale):  8.5  / 10 Medication Changes/New allergies or changes in medical history, any new surgeries or procedures? NO    If yes, update Summary List  
Subjective Functional Status/Changes:  []  No changes reported Pt states \"big toe has reduced a lot, a little numbness in there. Against the wall I am able to do that one at work. Pain is worse its in the lower back, L hip. Back of the leg, left knee and worst is the ankle, I carefully get up in the morning and then I feel it go down the leg, as the day goes on it builds period of relief then it comes back\" OBJECTIVE Modality Modalities Rationale: decrease inflammation and decrease pain to improve patient's ability to perform ADLs. 10 min [x] Estim, type: IFC to L/S prone over pillow  
                                      []  att     [x]  unatt     []  w/US     [x]  w/ice    []  w/heat 
 min []  Mechanical Traction: type/lbs   
                                           []  pro   []  sup   []  int   []  cont  
 min []  Ultrasound, settings/location:    
 min []  Iontophoresis:  []  take home patch w/ dexamethazone  
 min                                []  in clinic w/ dexamethazone  
 min []  Ice     []  Heat     position:   
 min []  Other:   
43 min Therapeutic Exercise:  [x]  See flow sheet  
[]  Other:     
[x]  Added:  Piriformis stretch sitting  
to improve (function):   
[]  Changed: Rationale: To increase ROM, flexibility, and increase strength to improve the patients ability to perform standing ADLs 
9 min Manual Therapy: Prone trigger point release to L>R piriformis, STM to L QL and PALMER paraspinals Rationale:      decrease pain, increase ROM, increase tissue extensibility and decrease trigger points in L/S to improve patient's ability to tolerate rising in the morning 
 min Patient Education:  YES  Reviewed HEP []  Progressed/Changed HEP based on: Other Objective/Functional Measures: Added piriformis stretch with good pt tolerance and no complaints of sx. L/S & LE MMT/AROM Pre Tx  
Pain location/sx Top of left foot Forward flexion  50% Extension  25% R/L SB 75/50% R/L Rotation 95%/95% R/L Sideglide 25%/50% Post Treatment Pain Level (on 0 to 10) scale:   5  / 10 ASSESSMENT Assessment/Changes in Function: Pt presented with increased TTP and mm tone in L pirifomis. S/P L SG at wall with hand over pressure showed >> incr than standing self SG for easing sx in L the ankle (increased in the L thigh ie positive movement of sx closer to L/S). Instriuction in tennis ball piriformis release for mgt of post LE sx.   
[]  See Progress Note/Recertification Patient will continue to benefit from skilled PT services to modify and progress therapeutic interventions, address functional mobility deficits, address ROM deficits, address strength deficits, analyze and address soft tissue restrictions, analyze and cue movement patterns, analyze and modify body mechanics/ergonomics and assess and modify postural abnormalities to attain remaining goals. Progress toward goals / Updated goals: 
Progressing towards STG 1-4.  
1) Establish HEP. Goal in progress: HEP issued = 10-20 reps every 6-8 hours L SG at wall with hand over pressure 2) Patient will report decreased c/o pain to < or = 7/10 at the worst to facilitate prolonged sitting with manageable sx in L/S. 3) Patient will report 25% improvement in ability to rise in the morning. Goal in progress - Patient reports continued L LE shooting pain with rising. 4) Patient will report 25% improvement in left LE radicular symptoms in order to facilitate ease with prolonged sitting Goal in progress - patient reports decreased L great to N/T sx, with incr sx in L ankle PLAN [x]  Upgrade activities as tolerated YES Continue plan of care  
[]  Discharge due to :   
[]  Other:   
 
Therapist: Jass Lucio DPT Date: 4/16/2019 Time: 1:39 PM  
 
Future Appointments Date Time Provider Christelle Perkins 4/18/2019  2:00 PM [facility]  
4/24/2019  3:15 PM Leontine Cecilia, [facility]  
4/26/2019  2:15 PM Leontine Layer, [facility]  
4/29/2019  2:30 PM Leonchristopher Brooks, [facility]

## 2019-04-18 ENCOUNTER — HOSPITAL ENCOUNTER (OUTPATIENT)
Dept: PHYSICAL THERAPY | Age: 54
Discharge: HOME | End: 2019-04-18
Payer: MEDICAID

## 2019-04-18 PROCEDURE — 97014 ELECTRIC STIMULATION THERAPY: CPT | Performed by: PHYSICAL THERAPIST

## 2019-04-18 PROCEDURE — 97110 THERAPEUTIC EXERCISES: CPT | Performed by: PHYSICAL THERAPIST

## 2019-04-18 PROCEDURE — 97140 MANUAL THERAPY 1/> REGIONS: CPT | Performed by: PHYSICAL THERAPIST

## 2019-04-18 NOTE — PROGRESS NOTES
PHYSICAL THERAPY - DAILY TREATMENT NOTE   Patient Name: Nikky Monsalve        Date: 2019 : 1965   YES Patient  Verified Visit #:   7   of   10  Insurance: Payor: [employer] MEDICAID / Plan: [facility] [employer] [place] [employer] / Product Type: Managed Care Medicaid / In time: 2:00 Out time: 3:08 Total Treatment Time: 76 Medicare/BCBS Time Tracking (below) Total Timed Codes (min):  na 1:1 Treatment Time:  na  
TREATMENT AREA =  Lumbago [M54.5] SUBJECTIVE Pain Level (on 0 to 10 scale):  7  / 10 Medication Changes/New allergies or changes in medical history, any new surgeries or procedures? NO    If yes, update Summary List  
Subjective Functional Status/Changes:  []  No changes reported States compliant with HEP and doing his back ex while at work. Currently having N/T into the LLE into the toes. OBJECTIVE 
  
          
Modality Modalities Rationale: decrease inflammation and decrease pain to improve patient's ability to perform ADLs. 10  min [x] Estim, type: IFC to L/S prone   
                                      []  att     [x]  unatt     []  w/US     [x]  w/ice    []  w/heat  
  min []  Mechanical Traction: type/lbs    
                                           []  pro   []  sup   []  int   []  cont  
  min []  Ultrasound, settings/location:     
  min []  Iontophoresis:  []  take home patch w/ dexamethazone  
  min                                []  in clinic w/ dexamethazone  
  min []  Ice     []  Heat     position:    
  min []  Other:    
  
        
50 min Therapeutic Exercise:  [x]  See flow sheet  
[]  Other:       
[x]  Added:  Piriformis stretch sitting   
to improve (function):      
[]  Changed:      
Rationale: To increase ROM, flexibility, and increase strength to improve the patients ability to perform standing ADLs 
  
8 min Manual Therapy: Prone trigger point release to L piriformis, STM to L QL and L paraspinals Rationale:      decrease pain, increase ROM, increase tissue extensibility and decrease trigger points in L/S to improve patient's ability to tolerate rising in the morning 
  
     
  min Patient Education:  YES  Updated HEP, progressed to REIL/OANH, return ti SG if needed. Edu on spine and nerve anatomy, centralization vs peripheralization, and to discontinue REIL/S if peripheralization occurs. []  Progressed/Changed HEP based on:      
  
   
Other Objective/Functional Measures: 
  
Added piriformis stretch with good pt tolerance and no complaints of sx.  
  
L/S & LE MMT/AROM Pre Tx Post  
Pain location/sx Left foot/toes L ankle Forward flexion  50% NT Extension  25% 30% Repeated movement test: 
Prone: Inc LBP Prone HOC: Dec LBP, Dec foot/toes, better Repeated lumbar extension in prone: foot abolished, better Post Treatment Pain Level (on 0 to 10) scale:   2-3  / 10  
  
ASSESSMENT Assessment/Changes in Function:  
 
Patient able to progress to sagital plane movement with positive mechanical and symptom response to repeated lumbar extension. Patient updated HEP to perform REIL/OANH hourly or when symptoms present. []  See Progress Note/Recertification Patient will continue to benefit from skilled PT services to modify and progress therapeutic interventions, address functional mobility deficits, address ROM deficits, address strength deficits, analyze and address soft tissue restrictions, analyze and cue movement patterns, analyze and modify body mechanics/ergonomics and assess and modify postural abnormalities to attain remaining goals. Progress toward goals / Updated goals: 
  
Progressing towards STG 1-4.  
1) Establish HEP. Goal in progress: HEP issued = 10-20 reps every 6-8 hours L SG at wall with hand over pressure 2) Patient will report decreased c/o pain to < or = 7/10 at the worst to facilitate prolonged sitting with manageable sx in L/S. 3) Patient will report 25% improvement in ability to rise in the morning. Goal in progress - Patient reports continued L LE shooting pain with rising. 4) Patient will report 25% improvement in left LE radicular symptoms in order to facilitate ease with prolonged sitting Goal in progress - patient reports decreased L great to N/T sx, with incr sx in L ankle 
  
   
  
PLAN [x]  Upgrade activities as tolerated YES Continue plan of care  
[]  Discharge due to :    
[x]  Other: Assess response to extension progression  
  
 
 
 
Therapist: Jose Eduardo Lu DPT Date: 4/18/2019 Time: 12:53 PM

## 2019-04-24 ENCOUNTER — HOSPITAL ENCOUNTER (OUTPATIENT)
Dept: PHYSICAL THERAPY | Age: 54
Discharge: HOME | End: 2019-04-24
Payer: MEDICAID

## 2019-04-24 PROCEDURE — 97014 ELECTRIC STIMULATION THERAPY: CPT

## 2019-04-24 PROCEDURE — 97140 MANUAL THERAPY 1/> REGIONS: CPT

## 2019-04-24 PROCEDURE — 97110 THERAPEUTIC EXERCISES: CPT

## 2019-04-24 NOTE — PROGRESS NOTES
PHYSICAL THERAPY - DAILY TREATMENT NOTE Patient Name: Nikky Monsalve        Date: 2019 : 1965   YES Patient  Verified Visit #:     Insurance: Payor: [facility] MEDICAID / Plan: [facility] [employer] [place] [employer] / Product Type: Managed Care Medicaid / In time: 3:05 pm Out time: 3:56 pm  
Total Treatment Time: 51 Medicare Time /BCBS Tracking (below) Total Timed Codes (min):  na 1:1 Treatment Time:  na  
TREATMENT AREA =  Lumbago [M54.5] SUBJECTIVE Pain Level (on 0 to 10 scale): 6 / 10 Medication Changes/New allergies or changes in medical history, any new surgeries or procedures? NO    If yes, update Summary List  
Subjective Functional Status/Changes:  []  No changes reported Pt reports I had the best three days I've had in months after I left here the other day. OBJECTIVE Modalities Rationale:     decrease edema, decrease inflammation, decrease pain and increase tissue extensibility to improve patient's ability to perform functional ADLs 10 min [x] Estim, type/location: IFC low back, post hip prone over 1 pillow   
                                 []  att     [x]  unatt     []  w/US     [x]  w/ice    []  w/heat 
 min []  Mechanical Traction: type/lbs   
               []  pro   []  sup   []  int   []  cont    []  before manual    []  after manual  
 min []  Ultrasound, settings/location:    
 min []  Iontophoresis w/ dexamethasone, location:   
                                           []  take home patch       []  in clinic  
 min []  Ice     []  Heat    location/position:   
 min []  Vasopneumatic Device, press/temp:   
 min []  Other:   
[x] Skin assessment post-treatment (if applicable):   
[x]  intact    []  redness- no adverse reaction    
[]redness  adverse reaction:     
33 min Therapeutic Exercise:  [x]  See flow sheet Rationale:      increase ROM and increase strength to improve the patients ability to perform lifting and bending 8 min Manual Therapy: Prone over 1 pillow ; DTM to left piriformis; STM/DTM to lower lumbar paraspinals Rationale:      decrease pain, increase ROM, increase tissue extensibility and decrease trigger points to improve patient's ability to perform lifting and bending  
 min Patient Education:  YES  Reviewed HEP []  Progressed/Changed HEP based on: Other Objective/Functional Measures: 
 
Resume supine abdominal draw Post Treatment Pain Level (on 0 to 10) scale:   3  / 10- hip, back of leg, foot meets ankle ASSESSMENT Assessment/Changes in Function:  
Pt demonstrating good pain relief with standing repeated trunk extension against wall. Resumed gentle core stabilization exercise. Unable to fully abolish left LE radicular symptoms  
[]  See Progress Note/Recertification Patient will continue to benefit from skilled PT services to modify and progress therapeutic interventions, address functional mobility deficits, address ROM deficits, address strength deficits, analyze and address soft tissue restrictions and instruct in home and community integration to attain remaining goals. Progress toward goals / Updated goals: 
1) Establish HEP. Goal in progress: HEP issued = 10-20 reps every 6-8 hours L SG at wall with hand over pressure 2) Patient will report decreased c/o pain to < or = 7/10 at the worst to facilitate prolonged sitting with manageable sx in L/S.- goal in progress 3) Patient will report 25% improvement in ability to rise in the morning. Goal in progress - Patient reports continued L LE shooting pain with rising.   
4) Patient will report 25% improvement in left LE radicular symptoms in order to facilitate ease with prolonged sitting Goal in progress - patient reports decreased L great to N/T sx, with incr sx in L ankle PLAN 
[]  Upgrade activities as tolerated YES Continue plan of care  
[]  Discharge due to :   
[]  Other:   
 
Therapist: Sky Moreno, PTA   
 Date: 4/24/2019 Time: 3:56 PM  
 
Future Appointments Date Time Provider Christelle Perkins 4/24/2019  3:15 PM Gaurav Rae PTA [facility]  
4/26/2019  2:15 PM Gaurav Rae PTA [facility]  
4/29/2019  2:30 PM Gaurav Rae, MONI [facility]

## 2019-04-26 ENCOUNTER — HOSPITAL ENCOUNTER (OUTPATIENT)
Dept: PHYSICAL THERAPY | Age: 54
Discharge: HOME | End: 2019-04-26
Payer: MEDICAID

## 2019-04-26 PROCEDURE — 97110 THERAPEUTIC EXERCISES: CPT

## 2019-04-26 PROCEDURE — 97140 MANUAL THERAPY 1/> REGIONS: CPT

## 2019-04-26 NOTE — PROGRESS NOTES
PHYSICAL THERAPY - DAILY TREATMENT NOTE Patient Name: Kayla Hernandez        Date: 2019 : 1965   YES Patient  Verified Visit #:     Insurance: Payor: [facility] MEDICAID / Plan: [facility] [employer] [place] [employer] / Product Type: Managed Care Medicaid / In time: 1:57 pm Out time: 3:00 pm  
Total Treatment Time: 61 Medicare Time /BCBS Tracking (below) Total Timed Codes (min):  na 1:1 Treatment Time:  na  
TREATMENT AREA =  Lumbago [M54.5] SUBJECTIVE Pain Level (on 0 to 10 scale):  5  / 10- left hip to ankle Medication Changes/New allergies or changes in medical history, any new surgeries or procedures? NO    If yes, update Summary List  
Subjective Functional Status/Changes:  []  No changes reported Pt reports pain relief after last session into am. 
  
 
OBJECTIVE Modalities Rationale:     decrease edema, decrease inflammation, decrease pain and increase tissue extensibility to improve patient's ability to perform ADLs without radicular pain 10 min [x] Estim, type/location: Low back, post hip IFC []  att     [x]  unatt     []  w/US     [x]  w/ice    []  w/heat 
 min []  Mechanical Traction: type/lbs   
               []  pro   []  sup   []  int   []  cont    []  before manual    []  after manual  
 min []  Ultrasound, settings/location:    
 min []  Iontophoresis w/ dexamethasone, location:   
                                           []  take home patch       []  in clinic  
 min []  Ice     []  Heat    location/position:   
 min []  Vasopneumatic Device, press/temp:   
 min []  Other:   
[x] Skin assessment post-treatment (if applicable):   
[x]  intact    []  redness- no adverse reaction    
[]redness  adverse reaction:     
43/ 23 min Therapeutic Exercise:  [x]  See flow sheet Rationale:      increase ROM and increase strength to improve the patients ability to sleeping, donning and doffing 10 min Manual Therapy: Prone over 1 pillow ; DTM to left piriformis; STM/DTM to lower lumbar paraspinals Rationale:      decrease pain, increase ROM, increase tissue extensibility and decrease trigger points to improve patient's ability to improve tissue mobility in ADLs 
 min Patient Education:  YES  Reviewed HEP []  Progressed/Changed HEP based on: Other Objective/Functional Measures: Add treadmill walking x 3 min @ 1.4 mph, ZION 5x 5 second hold, tband rows Post Treatment Pain Level (on 0 to 10) scale:  3  / 10- left hip ASSESSMENT Assessment/Changes in Function:  
Pt able to centralize radicular pain to left hip . Unable to fully abolish. Increased hypertonicity in left piriformis/ Q-L persists. []  See Progress Note/Recertification Patient will continue to benefit from skilled PT services to modify and progress therapeutic interventions, address functional mobility deficits, address ROM deficits, address strength deficits and instruct in home and community integration to attain remaining goals. Progress toward goals / Updated goals: 
1) Establish HEP. Goal in progress: HEP issued = 10-20 reps every 6-8 hours L SG at wall with hand over pressure 2) Patient will report decreased c/o pain to < or = 7/10 at the worst to facilitate prolonged sitting with manageable sx in L/S.- goal in progress 3) Patient will report 25% improvement in ability to rise in the morning. Goal in progress - Patient reports continued L LE shooting pain with rising.   
4) Patient will report 25% improvement in left LE radicular symptoms in order to facilitate ease with prolonged sitting Goal in progress PLAN 
[]  Upgrade activities as tolerated YES Continue plan of care  
[]  Discharge due to :   
[]  Other:   
 
Therapist: Jaleel Garay PTA Date: 4/26/2019 Time: 3:00 PM  
 
Future Appointments Date Time Provider Christelle Perkins 4/26/2019  2:15 PM Johnathan Sesay PTA [facility]  
 4/29/2019  2:30 PM Eli Mcmanus PTA [facility]

## 2019-04-29 ENCOUNTER — HOSPITAL ENCOUNTER (OUTPATIENT)
Dept: PHYSICAL THERAPY | Age: 54
Discharge: HOME | End: 2019-04-29
Payer: MEDICAID

## 2019-04-29 PROCEDURE — 97110 THERAPEUTIC EXERCISES: CPT

## 2019-04-29 PROCEDURE — 97014 ELECTRIC STIMULATION THERAPY: CPT

## 2019-04-29 PROCEDURE — 97140 MANUAL THERAPY 1/> REGIONS: CPT

## 2019-04-29 NOTE — PROGRESS NOTES
PHYSICAL THERAPY - DAILY TREATMENT NOTE Patient Name: Asa Pencil        Date: 2019 : 1965   YES Patient  Verified Visit #:   10   of   12  Insurance: Payor: [facility] MEDICAID / Plan: [facility] [employer] [place] [employer] / Product Type: Managed Care Medicaid / In time: 2:03 pm Out time: 3:08 pm  
Total Treatment Time: 72 Medicare Time /BCBS Tracking (below) Total Timed Codes (min):  na 1:1 Treatment Time:  na  
TREATMENT AREA =  Lumbago [M54.5] SUBJECTIVE Pain Level (on 0 to 10 scale):  7 / 10 left hip to foot. Medication Changes/New allergies or changes in medical history, any new surgeries or procedures? NO    If yes, update Summary List  
Subjective Functional Status/Changes:  []  No changes reported Saturday was pretty good. It got worse yesterday. I woke up OK. As soon as I started moving around. Nothing seemed to loosen it up. Pt reports running to get out of the rain Friday night OBJECTIVE Modalities Rationale:     decrease edema, decrease inflammation, decrease pain and increase tissue extensibility to improve patient's ability to perform prolonged standing 10 min [x] Estim, type/location: Prone over 1 pillow low back, post hip   
                                 []  att     []  unatt     []  w/US     []  w/ice    []  w/heat 
 min []  Mechanical Traction: type/lbs   
               []  pro   []  sup   []  int   []  cont    []  before manual    []  after manual  
 min []  Ultrasound, settings/location:    
 min []  Iontophoresis w/ dexamethasone, location:   
                                           []  take home patch       []  in clinic  
 min []  Ice     []  Heat    location/position:   
 min []  Vasopneumatic Device, press/temp:   
 min []  Other:   
[x] Skin assessment post-treatment (if applicable):   
[x]  intact    []  redness- no adverse reaction    
[]redness  adverse reaction:     
45 min Therapeutic Exercise:  [x]  See flow sheet Rationale:      increase ROM and increase strength to improve the patients ability to perform prolonged standing 10 min Manual Therapy: Corrected right post innominate with MET; R>L LE distraction; Prone over 1 pillow ; DTM to left piriformis; STM/DTM to lower lumbar paraspinals Rationale:      decrease pain, increase ROM, increase tissue extensibility and decrease trigger points to improve patient's ability to improve tissue mobility in ADLs 
 min Patient Education:  YES  Reviewed HEP []  Progressed/Changed HEP based on: Other Objective/Functional Measures: Add sit hamstring stretch, prone press ups, supine nerve glides Post Treatment Pain Level (on 0 to 10) scale:   4 / 10- left hip ASSESSMENT Assessment/Changes in Function:  
Pt able to correct right lateral shift with repeated trunk extension on wall. Unable to fully abolish pain with positioning or exercise. Pt demonstrating improving tolerance to exercise without increasing radicular symptoms. Pt able to central pain to left hip after session. Corrected right posterior rotation with MET 
  
[]  See Progress Note/Recertification Patient will continue to benefit from skilled PT services to modify and progress therapeutic interventions, address functional mobility deficits, address ROM deficits, address strength deficits and instruct in home and community integration to attain remaining goals. Progress toward goals / Updated goals: 
Continue toward all updated goals. PLAN 
[]  Upgrade activities as tolerated YES Continue plan of care  
[]  Discharge due to :   
[]  Other:   
 
Therapist: [place] England PTA Date: 4/29/2019 Time: 5:08 pm  
 
Future Appointments Date Time Provider Christelle Perkins 4/29/2019  2:30 PM Marina Gupta PTA [facility]

## 2019-04-29 NOTE — PROGRESS NOTES
[address] PHYSICAL THERAPY AT [facility] [address], [address], [place] [address] - Phone: [phone]  Fax: [phone] PROGRESS NOTE Patient Name: Harlan Klinefelter : 1965 Treatment/Medical Diagnosis: Lumbago [M54.5] Referral Source: Steve Blanton MD    
Date of Initial Visit: 3-27-19 Attended Visits: 10 Missed Visits: 0  
SUMMARY OF TREATMENT Physical Therapy services for Therapeutic exercise for lumbar stabilization and ROM, Therapeutic Activity for patient education in posture, body mechanics, and ergonomics, Manual Therapy, HEP, Interferential electrical stimulation, and moist heat/ice. CURRENT STATUS Patient has progressed well in Physical Therapy, consistently reporting improving ROM, decreasing pain, and increased functional ability. Patient pain range: 3 to 7/10 with intermitent LLE pain. Current pain level: 7/10 with L LE radicular sx to foot. Pt reports 90% improvement in numbness in bottom of the left great toe. . Functional deficits are prolonged standing, prolonged walking, . Functional improvements are decreased pain rising in am, frequency of LLE radicular symptoms, donning and doffing shoes and socks,Sleeping,  Patient would benefit from continued PT intervention in order to improve SI mobility, Flexibility and core strength, decrease/centralize pain, and for continued education on posture, body mechanics, and to address remaining impairments. Goal/Measure of Progress Goal Met? 1.   Establish HEP. Status at last Eval: dependent Current Status: Pt instructed in initial HEP. yes 2. Patient will report decreased c/o pain to < or = 7/10 at the worst to facilitate prolonged sitting with manageable sx in L/S Status at last Eval: 6 to 10/10 Current Status: 3 to 7/10 yes 3. Patient will report 25% improvement in ability to rise in the morning. Status at last Eval: na Current Status: 75% yes 4.  Patient will report 25% improvement in left LE radicular symptoms in order to facilitate ease with prolonged sitting. Status at last Eval: na Current Status: 75% in tolerance to sitting. yes New Goals to be achieved in _4__  weeks: 
1) Patient independent with HEP and able to demo proper body mechanics for floor to chest lifting. 2) Patient will increase L/S ROM in all directions to pain free and WNLs to increase ability to don and doff shoes. 3) Increase FOTO to 48/100 indicating improved function and quality of life. 4) Patient will report centralization left LE radicular symptoms in order to facilitate ease with prolonged sitting. 5) Patient to perform 100% bridge indicating improved core strength to improve ambulation around the grocery store RECOMMENDATIONS Plan to continue 2x per week x 4 weeks If you have any questions/comments please contact us directly at  [phone]. Thank you for allowing us to assist in the care of your patient. LPTA Signature: Gabriele Ortega PTA  Date: 4/29/2019 PT Signature: Laura Langston DPT Time: 6:46 AM  
NOTE TO PHYSICIAN:  PLEASE COMPLETE THE ORDERS BELOW AND FAX TO [facility]: [phone]. If you are unable to process this request in 24 hours please contact our office:  [phone]. 
 
___ I have read the above report and request that my patient continue as recommended.  
___ I have read the above report and request that my patient continue therapy with the following changes/special instructions:_________________________________________________________  
___ I have read the above report and request that my patient be discharged from therapy.   
 
Physician Signature:       Date:      Time:

## 2019-05-02 ENCOUNTER — HOSPITAL ENCOUNTER (OUTPATIENT)
Dept: PHYSICAL THERAPY | Age: 54
Discharge: HOME | End: 2019-05-02
Payer: MEDICAID

## 2019-05-02 PROCEDURE — 97140 MANUAL THERAPY 1/> REGIONS: CPT

## 2019-05-02 PROCEDURE — 97014 ELECTRIC STIMULATION THERAPY: CPT

## 2019-05-02 PROCEDURE — 97110 THERAPEUTIC EXERCISES: CPT

## 2019-05-02 PROCEDURE — 97530 THERAPEUTIC ACTIVITIES: CPT

## 2019-05-02 NOTE — PROGRESS NOTES
PHYSICAL THERAPY - DAILY TREATMENT NOTE Patient Name: Cary Ardon        Date: 2019 : 1965   YES Patient  Verified Visit #:     Insurance: Payor: [facility] MEDICAID / Plan: [employer] [place] [employer] / Product Type: Managed Care Medicaid / In time: 10:02 am Out time: 11:10 am  
Total Treatment Time: 76 Medicare Time /BCBS Tracking (below) Total Timed Codes (min):  n/a 1:1 Treatment Time:  n/a  
TREATMENT AREA =  Lumbago [M54.5] SUBJECTIVE Pain Level (on 0 to 10 scale):  0  / 10 Medication Changes/New allergies or changes in medical history, any new surgeries or procedures? NO    If yes, update Summary List  
Subjective Functional Status/Changes:  []  No changes reported Pt reports left LE gave out on him twice since last visit. It happened from a standing position upon first initial step OBJECTIVE Modalities Rationale:     decrease edema, decrease inflammation, decrease pain and increase tissue extensibility to improve patient's ability to perform prolonged walking, standing 10 min [x] Estim, type/location: Prone lying IFC low back, post hip   
                                 []  att     [x]  unatt     []  w/US     [x]  w/ice    []  w/heat 
 min []  Mechanical Traction: type/lbs   
               []  pro   []  sup   []  int   []  cont    []  before manual    []  after manual  
 min []  Ultrasound, settings/location:    
 min []  Iontophoresis w/ dexamethasone, location:   
                                           []  take home patch       []  in clinic  
 min []  Ice     []  Heat    location/position:   
 min []  Vasopneumatic Device, press/temp:   
 min []  Other:   
[x] Skin assessment post-treatment (if applicable):   
[x]  intact    []  redness- no adverse reaction    
[]redness  adverse reaction:     
40 min Therapeutic Exercise:  [x]  See flow sheet Rationale:      increase ROM and increase strength to improve the patients ability to  perform prolonged walking, standing 10 min Manual Therapy: DTM to left piriformis; STM/DTM to lower lumbar paraspinals in prone Rationale:      decrease pain, increase ROM, increase tissue extensibility and decrease trigger points to improve patient's ability to impove tissue mobility in ADLs 8 min Therapeutic Activity: Golfers bend body mechanics, static neutral spine  standing ; squatting and sit to stand body mechanics Rationale:    increase ROM and increase proprioception to improve the patients ability to perform functional ADLs 
 
 
 min Patient Education:  YES  Reviewed HEP []  Progressed/Changed HEP based on: Other Objective/Functional Measures: Add golfers bend, standing 3 way hip, mini squat, pt education in proper hip hinge body mechanics Post Treatment Pain Level (on 0 to 10) scale:   2 / 10 ASSESSMENT Assessment/Changes in Function:  
Add golfers bend body mechanics with VCs and demonstrating improving use of hip hinge after instruction in golfers bend and quarter squat. TrPt tenderness noted Left piriformis region. Unable to fully abolish pain with exercise or positioning 
  
[]  See Progress Note/Recertification Patient will continue to benefit from skilled PT services to modify and progress therapeutic interventions, address functional mobility deficits, address ROM deficits, address strength deficits, analyze and address soft tissue restrictions, analyze and cue movement patterns and instruct in home and community integration to attain remaining goals. Progress toward goals / Updated goals: 
Continue toward all updated LTG goals PLAN 
[]  Upgrade activities as tolerated YES Continue plan of care  
[]  Discharge due to :   
[]  Other:   
 
Therapist: Parisa Olivas PTA Date: 5/2/2019 Time: 11:10 AM  
 
Future Appointments Date Time Provider Christelle Perkins 5/6/2019  5:15 PM Harjit Hurtado [facility]  
 5/9/2019  7:45 AM Nerissa HOLBROOK [facility]  
5/15/2019  4:00 PM Colton Davis, PTA [facility]  
5/16/2019 10:00 AM Adriana Flores, PT [facility]

## 2019-05-06 ENCOUNTER — HOSPITAL ENCOUNTER (OUTPATIENT)
Dept: PHYSICAL THERAPY | Age: 54
Discharge: HOME | End: 2019-05-06
Payer: MEDICAID

## 2019-05-06 PROCEDURE — 97140 MANUAL THERAPY 1/> REGIONS: CPT

## 2019-05-06 PROCEDURE — 97014 ELECTRIC STIMULATION THERAPY: CPT

## 2019-05-06 PROCEDURE — 97110 THERAPEUTIC EXERCISES: CPT

## 2019-05-06 NOTE — PROGRESS NOTES
PHYSICAL THERAPY - DAILY TREATMENT NOTE Patient Name: Delmer Glass        Date: 2019 : 1965   YES Patient  Verified Visit #:   (12)   Insurance: Payor: Ivar Fothergill / Plan: [facility] [employer]UN[employer] [place] [employer] / Product Type: Managed Care Medicaid / In time: 5:08 Out time: 6:22 Total Treatment Time: 76 BCBS and Medicare Time Tracking (below) Total Timed Codes (min):  64 1:1 Treatment Time:  59 TREATMENT AREA =  Lumbago [M54.5] SUBJECTIVE Pain Level (on 0 to 10 scale):  3  / 10 Medication Changes/New allergies or changes in medical history, any new surgeries or procedures? NO    If yes, update Summary List  
Subjective Functional Status/Changes:  []  No changes reported Pt states \"its either good and I feel the pins and needles sensation, 30 seconds or so (im standing or walking or sitting). Longer and more periods of a lot less pain, hurts about 7-8 at the worst and 3/10 better, the bad periods ate getting shorter, instead of all afternoon its n hour\" OBJECTIVE Modality Modalities Rationale: decrease pain to improve patient's ability to perform ADLs. 10 min [x] Estim, type: IFC low back prone  
                                      []  att     [x]  unatt     []  w/US     [x]  w/ice    []  w/heat 
 min []  Mechanical Traction: type/lbs   
                                           []  pro   []  sup   []  int   []  cont  
 min []  Ultrasound, settings/location:    
 min []  Iontophoresis:  []  take home patch w/ dexamethazone  
 min                                []  in clinic w/ dexamethazone  
 min []  Ice     []  Heat     position:   
 min []  Other:   
56 min Therapeutic Exercise:  [x]  See flow sheet  
[]  Other:     
[x]  Added:  OANH with L SG  
to improve (function):   
[]  Changed:    
Rationale: To increase ROM, flexibility, and increase strength to improve the patients ability to perform standing ADLs 8 min Therapeutic Activity: Educated patient on specific guidelines to ensure safe management with performance of OANH with L SG therex HEP. Recommended that the patient closely observe the effects of exercise on sx during HEP to determine safe continuation of home program. If while monitoring sx the following changes occur: location of pain spreads away from the spine or into the LE, sx are produced (sx not present prior to initiation) or increased, sx remain worse after therex, or numbness and tingling begins discontinue exercise and consult PT via phone or next tx visit. Encouraged the patient to continue with therex if the sx previously felt further away from the lumbar spine or in the LE move towards the midline of the spine as centralization of pain that occurs as you exercise is a good sign. Rationale:    increase ROM to improve the patients ability to perform HEP independently 
 min Patient Education:  Hussein Byrne []  Progressed/Changed HEP based on: Other Objective/Functional Measures: L/S & LE MMT/AROM Pre Tx Post OANH SG  
Pain location/sx Left lateral low back and lower calf incr L ant thigh decr L calf Forward flexion  65% 65% Extension  50% NT  
R/L SB 75% 85%/75% R/L Rotation 95% 100 R/L Sideglide 25%/50% NT  
R/L DF/GT 4+/4 Post Treatment Pain Level (on 0 to 10) scale:   3  / 10 ASSESSMENT Assessment/Changes in Function: S/P OANH with L SG L/S AROM improved in R SB and rotation and L calf sx abolished. Decreased L anterior tib MMT. REIL with L SG pro L N/T.  
  
[]  See Progress Note/Recertification Patient will continue to benefit from skilled PT services to modify and progress therapeutic interventions, address functional mobility deficits, address ROM deficits, address strength deficits, analyze and address soft tissue restrictions, analyze and cue movement patterns, analyze and modify body mechanics/ergonomics and assess and modify postural abnormalities to attain remaining goals. Progress toward goals / Updated goals: 
Progressing towards newly established LTGs. PLAN [x]  Upgrade activities as tolerated YES Continue plan of care  
[]  Discharge due to :   
[]  Other:   
 
Therapist: Kalin Ha DPT Date: 5/6/2019 Time: 5:04 PM  
 
Future Appointments Date Time Provider Christelle Perkins 5/6/2019  5:15 PM [facility]  
5/9/2019  7:45 AM [facility]  
5/15/2019  4:00 PM Antoinette Serra PTA [facility]  
5/16/2019 10:00 AM Britt Lan, PT [facility]

## 2019-05-09 ENCOUNTER — HOSPITAL ENCOUNTER (OUTPATIENT)
Dept: PHYSICAL THERAPY | Age: 54
Discharge: HOME | End: 2019-05-09
Payer: MEDICAID

## 2019-05-09 PROCEDURE — 97014 ELECTRIC STIMULATION THERAPY: CPT

## 2019-05-09 PROCEDURE — 97140 MANUAL THERAPY 1/> REGIONS: CPT

## 2019-05-09 PROCEDURE — 97110 THERAPEUTIC EXERCISES: CPT

## 2019-05-09 NOTE — PROGRESS NOTES
PHYSICAL THERAPY - DAILY TREATMENT NOTE Patient Name: William Stringer        Date: 2019 : 1965   YES Patient  Verified Visit #:   13 2   of   8  Insurance: Payor: Lizy Grissom / Plan: [facility] [employer]UN[employer] [place] [employer] / Product Type: Managed Care Medicaid / In time: 7:40 Out time: 8:50 Total Treatment Time: 70 BCBS and Medicare Time Tracking (below) Total Timed Codes (min): n/a 1:1 Treatment Time:  n/a  
TREATMENT AREA =  Lumbago [M54.5] SUBJECTIVE Pain Level (on 0 to 10 scale):  3  / 10 Medication Changes/New allergies or changes in medical history, any new surgeries or procedures? NO    If yes, update Summary List  
Subjective Functional Status/Changes:  []  No changes reported Pt states \"It was shooting down the left leg and the right started to bother me, enough to scare me, didn't remain. After I talked to you I stopped completely. Back the to the base line pain today where I was at before\" OBJECTIVE Modality Modalities Rationale: decrease pain to improve patient's ability to perform ADLs. 10 min [x] Estim, type:IFC low back prone  
                                      []  att     [x]  unatt     []  w/US     [x]  w/ice    []  w/heat 
 min []  Mechanical Traction: type/lbs   
                                           []  pro   []  sup   []  int   []  cont  
 min []  Ultrasound, settings/location:    
 min []  Iontophoresis:  []  take home patch w/ dexamethazone  
 min                                []  in clinic w/ dexamethazone  
 min []  Ice     []  Heat     position:   
 min []  Other:   
52 min Therapeutic Exercise:  [x]  See flow sheet  
[]  Other:     
[x]  Added:  Added clam, Bridge, and prone hip milt ext  
to improve (function):   
[x]  Changed:  Adv. March with core to dead big Rationale: To increase ROM, flexibility, and increase strength to improve the patients ability to perform standing ADLs 8 min Manual Therapy: Educated patient on tennis ball piriformis release, Prone PALMER trigger point release to L>R piriformis Rationale:    increase ROM to improve the patients ability to perform household chores 
 min Patient Education:  Blake Pugh []  Progressed/Changed HEP based on: Other Objective/Functional Measures: L/S & LE MMT/AROM Pre Tx  
Pain location/sx Left lateral low back and left lateral calf Forward flexion  85% Extension  50% R/L SB 85/75% R/L Rotation 75%/95% R/L Sideglide 25% R/L DF/GT 4+/4+ Post Treatment Pain Level (on 0 to 10) scale:  2  / 10 ASSESSMENT Assessment/Changes in Function:  
Cont TTP and tone to L piriformis mm. Demonstrates <50% supine bridge indicating decreased core and glute max mm strength. Able to perform 1/2 AROM with prone hip extension indicating deficits in L multifidus and glute max mms. Progressed core and back strengthening with increased challenge. []  See Progress Note/Recertification Patient will continue to benefit from skilled PT services to modify and progress therapeutic interventions, address functional mobility deficits, address ROM deficits, address strength deficits, analyze and address soft tissue restrictions, analyze and cue movement patterns, analyze and modify body mechanics/ergonomics and assess and modify postural abnormalities to attain remaining goals. Progress toward goals / Updated goals: 
Progressing towards LTGs 2/4 and 5.  
1) Patient independent with HEP and able to demo proper body mechanics for floor to chest lifting. 2) Patient will increase L/S ROM in all directions to pain free and WNLs to increase ability to don and doff shoes.  Goal in progress - see above limited FF and R SB/L SG 
3) Increase FOTO to 48/100 indicating improved function and quality of life.  
4) Patient will report centralization left LE radicular symptoms in order to facilitate ease with prolonged sitting. Goal in progress - intermittent relief from L LE sx, however, continued Numbness and tibgle 5) Patient to perform 100% bridge indicating improved core strength to improve ambulation around the grocery store Goal in progress - 50% supine bridge PLAN [x]  Upgrade activities as tolerated YES Continue plan of care  
[]  Discharge due to :   
[]  Other:   
 
Therapist: Adrienne Franco DPT Date: 5/9/2019 Time: 5:04 PM  
 
Future Appointments Date Time Provider Christelle Perkins 5/9/2019  7:45 AM Yuridia HOLBROOK [facility]  
5/15/2019  4:00 PM Gómez Jaffe, PTA [facility]  
5/16/2019 10:00 AM Yuly Galeas, PT [facility]

## 2019-05-15 ENCOUNTER — HOSPITAL ENCOUNTER (OUTPATIENT)
Dept: PHYSICAL THERAPY | Age: 54
Discharge: HOME | End: 2019-05-15
Payer: MEDICAID

## 2019-05-15 PROCEDURE — 97110 THERAPEUTIC EXERCISES: CPT

## 2019-05-15 PROCEDURE — 97140 MANUAL THERAPY 1/> REGIONS: CPT

## 2019-05-15 PROCEDURE — 97014 ELECTRIC STIMULATION THERAPY: CPT

## 2019-05-15 NOTE — PROGRESS NOTES
PHYSICAL THERAPY - DAILY TREATMENT NOTE Patient Name: Maico Wallis        Date: 5/15/2019 : 1965   YES Patient  Verified Visit #:   (94) 4   of   8  Insurance: Payor: Huynh Musca / Plan: [facility] COMMUNITY PLAN [place] [employer] / Product Type: Managed Care Medicaid / In time: 4:05 pm Out time: 4:56 pm  
Total Treatment Time: 51 Medicare Time /BCBS Tracking (below) Total Timed Codes (min):  na 1:1 Treatment Time:  na  
TREATMENT AREA =  Lumbago [M54.5] SUBJECTIVE Pain Level (on 0 to 10 scale): 4 / 10 Medication Changes/New allergies or changes in medical history, any new surgeries or procedures? NO    If yes, update Summary List  
Subjective Functional Status/Changes:  []  No changes reported Pt reports its actually been below a 4/10. Very little in the foot -achy again early in am and eases as the day goes on. Its mainly in the hip and occasionally down the leg.but when it goes down the leg its nowhere near what it used to be. Today is the first day I was able to get dressed standing up. OBJECTIVE Modalities Rationale:     decrease inflammation, decrease pain and increase tissue extensibility to improve patient's ability to perform bending and lifting 10 min [x] Estim, type/location: IFC prone low back, post hip  
                                 []  att     [x]  unatt     []  w/US     [x]  w/ice    []  w/heat 
 min []  Mechanical Traction: type/lbs   
               []  pro   []  sup   []  int   []  cont    []  before manual    []  after manual  
 min []  Ultrasound, settings/location:    
 min []  Iontophoresis w/ dexamethasone, location:   
                                           []  take home patch       []  in clinic  
 min []  Ice     []  Heat    location/position:   
 min []  Vasopneumatic Device, press/temp:   
 min []  Other:   
[x] Skin assessment post-treatment (if applicable):   
[x]  intact    []  redness- no adverse reaction []redness  adverse reaction:     
31 min Therapeutic Exercise:  [x]  See flow sheet Rationale:      increase ROM and increase strength to improve the patients ability to perform bending, dressing 10 min Manual Therapy: Prone left piriformis release; DTM to lower lumbar paraspinals Rationale:      decrease pain, increase ROM, increase tissue extensibility and decrease trigger points to improve patient's ability to improve tissue mobility in ADLs 
 
 min Patient Education:  YES  Reviewed HEP []  Progressed/Changed HEP based on: Other Objective/Functional Measures: 
 
Review HEP , positioning, use of HEP and activity modification in ADLs for pain management Post Treatment Pain Level (on 0 to 10) scale:   2  / 10 ASSESSMENT Assessment/Changes in Function:  
Trigger point tenderness persists in left piriformis and lower lumbar paraspinals. Pt demonstrating improving trunk AROM and decreasing L LE radicular symptoms in ADLs. Pt reporting no change radicular numbness in left foot  with exercise during session. []  See Progress Note/Recertification Patient will continue to benefit from skilled PT services to modify and progress therapeutic interventions, address functional mobility deficits, address ROM deficits, address strength deficits, analyze and address soft tissue restrictions, analyze and cue movement patterns and instruct in home and community integration to attain remaining goals. Progress toward goals / Updated goals: All current long term goals continue to be in progress 1) Patient independent with HEP and able to demo proper body mechanics for floor to chest lifting.-reviewed 2) Patient will increase L/S ROM in all directions to pain free and WNLs to increase ability to don and doff shoes.  Goal in progress 3) Increase FOTO to 48/100 indicating improved function and quality of life.  
4) Patient will report centralization left LE radicular symptoms in order to facilitate ease with prolonged sitting. Goal in progress - intermittent L LE sx  
5) Patient to perform 100% bridge indicating improved core strength to improve ambulation around the grocery store Goal in progress - 50% supine bridge PLAN 
[]  Upgrade activities as tolerated YES Continue plan of care  
[]  Discharge due to :   
[]  Other:   
 
Therapist: Erna Armas PTA Date: 5/15/2019 Time: 4:56 PM  
 
Future Appointments Date Time Provider Christelle Perkins 5/16/2019 10:00 AM Rambo Restrepo PT [facility]  
5/20/2019  5:30 PM Shelley Maloney PTA [facility]  
5/23/2019 11:15 AM Shelley Maloney PTA [facility]

## 2019-05-16 ENCOUNTER — HOSPITAL ENCOUNTER (OUTPATIENT)
Dept: PHYSICAL THERAPY | Age: 54
Discharge: HOME | End: 2019-05-16
Payer: MEDICAID

## 2019-05-16 PROCEDURE — 97140 MANUAL THERAPY 1/> REGIONS: CPT

## 2019-05-16 PROCEDURE — 97110 THERAPEUTIC EXERCISES: CPT

## 2019-05-16 PROCEDURE — 97014 ELECTRIC STIMULATION THERAPY: CPT

## 2019-05-16 NOTE — PROGRESS NOTES
PHYSICAL THERAPY - DAILY TREATMENT NOTE Patient Name: Dorothea Gaytan        Date: 2019 : 1965   YES Patient  Verified Visit #:   (53) 5   of   8  Insurance: Payor: Stan Plan / Plan: [facility] COMMUNITY PLAN [facility] [employer] / Product Type: Managed Care Medicaid / In time: 10:04 am Out time: 11:00 am  
Total Treatment Time: 64 Medicare Time /BCBS Tracking (below) Total Timed Codes (min):  na 1:1 Treatment Time:  na  
TREATMENT AREA =  Lumbago [M54.5] SUBJECTIVE Pain Level (on 0 to 10 scale): 5 / 10 Medication Changes/New allergies or changes in medical history, any new surgeries or procedures? NO    If yes, update Summary List  
Subjective Functional Status/Changes:  []  No changes reported Pt reports being achey. Pain is in L hip and goes down the leg. Doing OANH with L SG 8-10x a day or when the leg flares up. OBJECTIVE Modalities Rationale:     decrease inflammation, decrease pain and increase tissue extensibility to improve patient's ability to perform bending and lifting 10 min [x] Estim, type/location:IFC prone low back, post hip  
                                 []  att     [x]  unatt     []  w/US     [x]  w/ice    []  w/heat 
 min []  Mechanical Traction: type/lbs   
               []  pro   []  sup   []  int   []  cont    []  before manual    []  after manual  
 min []  Ultrasound, settings/location:    
 min []  Iontophoresis w/ dexamethasone, location:   
                                           []  take home patch       []  in clinic  
 min []  Ice     []  Heat    location/position:   
 min []  Vasopneumatic Device, press/temp:   
 min []  Other:   
[x] Skin assessment post-treatment (if applicable):   
[x]  intact    []  redness- no adverse reaction    
[]redness  adverse reaction:     
36 min Therapeutic Exercise:  [x]  See flow sheet Rationale:      increase ROM and increase strength to improve the patients ability to perform bending, dressing 10 min Manual Therapy: Prone left piriformis release; DTM to lower lumbar paraspinals Rationale:      decrease pain, increase ROM, increase tissue extensibility and decrease trigger points to improve patient's ability to improve tissue mobility in ADLs 
 
 min Patient Education:  YES  Reviewed HEP []  Progressed/Changed HEP based on: Other Objective/Functional Measures: 
 
Pain centralized from calf to thigh and hip with OANH with left side glide. Added hip 3 way with core in standing. Post Treatment Pain Level (on 0 to 10) scale:   2  / 10 ASSESSMENT Assessment/Changes in Function:  
 Patient demonstrating ability to self manage symptoms. No increase in pain with SKTC. Needed verbal cues to maintain TA contraction with TM. Initiated standing core. []  See Progress Note/Recertification Patient will continue to benefit from skilled PT services to modify and progress therapeutic interventions, address functional mobility deficits, address ROM deficits, address strength deficits, analyze and address soft tissue restrictions, analyze and cue movement patterns and instruct in home and community integration to attain remaining goals. Progress toward goals / Updated goals: All current long term goals continue to be in progress 1) Patient independent with HEP and able to demo proper body mechanics for floor to chest lifting.-reviewed 2) Patient will increase L/S ROM in all directions to pain free and WNLs to increase ability to don and doff shoes.  Goal in progress 3) Increase FOTO to 48/100 indicating improved function and quality of life. 4) Patient will report centralization left LE radicular symptoms in order to facilitate ease with prolonged sitting. Goal in progress - intermittent L LE sx  
5) Patient to perform 100% bridge indicating improved core strength to improve ambulation around the grocery store Goal in progress - 50% supine bridge PLAN 
 [x]  Upgrade activities as tolerated YES Continue plan of care  
[]  Discharge due to :   
[]  Other:   
 
Therapist: Mario Law PT Date: 5/16/2019 Time: 11:00 AM  
 
Future Appointments Date Time Provider Christelle Perkins 5/20/2019  5:30 PM Segun Cano PTA [facility]  
5/23/2019 11:15 AM Segun Cano PTA [facility]

## 2019-05-20 ENCOUNTER — HOSPITAL ENCOUNTER (OUTPATIENT)
Dept: PHYSICAL THERAPY | Age: 54
Discharge: HOME | End: 2019-05-20
Payer: MEDICAID

## 2019-05-20 PROCEDURE — 97110 THERAPEUTIC EXERCISES: CPT

## 2019-05-20 PROCEDURE — 97014 ELECTRIC STIMULATION THERAPY: CPT

## 2019-05-20 PROCEDURE — 97140 MANUAL THERAPY 1/> REGIONS: CPT

## 2019-05-20 NOTE — PROGRESS NOTES
PHYSICAL THERAPY - DAILY TREATMENT NOTE Patient Name: Luanne Res        Date: 2019 : 1965   YES Patient  Verified Visit #:   (04) 6   of   8  Insurance: Payor: Radha Party / Plan: [facility] COMMUNITY PLAN [place] [employer] / Product Type: Managed Care Medicaid / In time: 5:26 pm Out time: 6:17 pm  
Total Treatment Time: 51 Medicare Time /BCBS Tracking (below) Total Timed Codes (min):  na 1:1 Treatment Time:  na  
TREATMENT AREA =  Lumbago [M54.5] SUBJECTIVE Pain Level (on 0 to 10 scale):  3 / 10- numbness L LE to foot Medication Changes/New allergies or changes in medical history, any new surgeries or procedures? NO    If yes, update Summary List  
Subjective Functional Status/Changes:  []  No changes reported Pt reports being mostly achey. Some tingling. For some reason the left hip started bothering me today. OBJECTIVE Modalities Rationale:     decrease edema, decrease inflammation, decrease pain and increase tissue extensibility to improve patient's ability to perform functional ADLs 10 min [x] Estim, type/location: IFC low back/post hip   
                                 []  att     [x]  unatt     []  w/US     [x]  w/ice    []  w/heat 
 min []  Mechanical Traction: type/lbs   
               []  pro   []  sup   []  int   []  cont    []  before manual    []  after manual  
 min []  Ultrasound, settings/location:    
 min []  Iontophoresis w/ dexamethasone, location:   
                                           []  take home patch       []  in clinic  
 min []  Ice     []  Heat    location/position:   
 min []  Vasopneumatic Device, press/temp:   
 min []  Other:   
[x] Skin assessment post-treatment (if applicable):   
[x]  intact    []  redness- no adverse reaction    
[]redness  adverse reaction:     
31 min Therapeutic Exercise:  [x]  See flow sheet Rationale:      increase ROM and increase strength to improve the patients ability to prolonged walking, standing 10 min Manual Therapy: Prone MFR to left Q-L, TrPt release to left piriformis and glut medius; DTM Rationale:      decrease pain, increase ROM, increase tissue extensibility and decrease trigger points to improve patient's ability to improve tissue mobility in ADLs 
     min Patient Education:  YES  Reviewed HEP []  Progressed/Changed HEP based on: Other Objective/Functional Measures: 
Resume prone knee flexion , prone hip IR, increase treadmill x 7 min Post Treatment Pain Level (on 0 to 10) scale:  3 / 10 ASSESSMENT Assessment/Changes in Function:  
Pt reporting temporary relief of L LE radicular pain with standing left side glide and trunk extension. Slow progress toward LTG for lasting symptom centralization. Add MFR release to left Q-L   
[]  See Progress Note/Recertification Patient will continue to benefit from skilled PT services to modify and progress therapeutic interventions, address functional mobility deficits, address ROM deficits, address strength deficits and instruct in home and community integration to attain remaining goals. Progress toward goals / Updated goals: 
1) Patient independent with HEP and able to demo proper body mechanics for floor to chest lifting.-reviewed 2) Patient will increase L/S ROM in all directions to pain free and WNLs to increase ability to don and doff shoes.  Goal in progress 3) Increase FOTO to 48/100 indicating improved function and quality of life. 4) Patient will report centralization left LE radicular symptoms in order to facilitate ease with prolonged sitting. Goal in progress - intermittent L LE sx  
5) Patient to perform 100% bridge indicating improved core strength to improve ambulation around the grocery store Goal in progress - 50% supine bridge PLAN 
[]  Upgrade activities as tolerated YES Continue plan of care  
[]  Discharge due to :   
[]  Other: Therapist: Erna Armas PTA Date: 5/20/2019 Time: 6:17 PM  
 
Future Appointments Date Time Provider Christelle Perkins 5/23/2019 11:15 AM Shelley Maloney PTA [facility]

## 2019-05-23 ENCOUNTER — HOSPITAL ENCOUNTER (OUTPATIENT)
Dept: PHYSICAL THERAPY | Age: 54
Discharge: HOME | End: 2019-05-23
Payer: MEDICAID

## 2019-05-23 PROCEDURE — 97014 ELECTRIC STIMULATION THERAPY: CPT

## 2019-05-23 PROCEDURE — 97140 MANUAL THERAPY 1/> REGIONS: CPT

## 2019-05-23 PROCEDURE — 97110 THERAPEUTIC EXERCISES: CPT

## 2019-05-23 NOTE — PROGRESS NOTES
PHYSICAL THERAPY - DAILY TREATMENT NOTE    Patient Name: Felicity Hernandez        Date: 2019  : 1965   YES Patient  Verified  Visit #:   (31) 7   gn   8  Insurance: Payor: Shaggy Shelley / Plan: [facility] [employer] [place] [employer] / Product Type: Managed Care Medicaid /      In time: 11:07 am Out time: 12;12 PM   Total Treatment Time: 55     Medicare Time /BCBS Tracking (below)   Total Timed Codes (min):  NA 1:1 Treatment Time:  NA     TREATMENT AREA =  Lumbago [M54.5]    SUBJECTIVE  Pain Level (on 0 to 10 scale):  3  / 10   Medication Changes/New allergies or changes in medical history, any new surgeries or procedures?     NO    If yes, update Summary List   Subjective Functional Status/Changes:  []  No changes reported     Pt reports mild radicular symptoms in calf persist.   Good compliance with HEP       OBJECTIVE  Modalities Rationale:     decrease edema, decrease inflammation, decrease pain and increase tissue extensibility to improve patient's ability to perform prolonged walking, lifting  10 min [x] Estim, type/location: IFC low back post hip                                     []  att     [x]  unatt     []  w/US     [x]  w/ice    []  w/heat    min []  Mechanical Traction: type/lbs                   []  pro   []  sup   []  int   []  cont    []  before manual    []  after manual    min []  Ultrasound, settings/location:      min []  Iontophoresis w/ dexamethasone, location:                                               []  take home patch       []  in clinic    min []  Ice     []  Heat    location/position:     min []  Vasopneumatic Device, press/temp:     min []  Other:    [x] Skin assessment post-treatment (if applicable):    [x]  intact    []  redness- no adverse reaction     []redness  adverse reaction:        35/22 min billed min Therapeutic Exercise:  [x]  See flow sheet   Rationale:      increase ROM and increase strength to improve the patients ability to perform prolonged walking, lifting    10 min Manual Therapy: Prone MFR to left Q-L, TrPt release to left piriformis and glut medius; DTM to left TFL, left proximal gastroc   Rationale:      decrease pain, increase ROM, increase tissue extensibility and decrease trigger points to improve patient's ability to improve tissue mobility in ADLs             min Patient Education:  YES  Reviewed HEP   []  Progressed/Changed HEP based on: Other Objective/Functional Measures: Add mini squats 10x 5 second hold     Post Treatment Pain Level (on 0 to 10) scale:  1-2  / 10-mild numbness LLE     ASSESSMENT  Assessment/Changes in Function:   Advanced core stabilization strengthening in standing and prone multifidus strengthening. Pt reporting mild fatigue after 12 reps of prone hip extension. []  See Progress Note/Recertification   Patient will continue to benefit from skilled PT services to modify and progress therapeutic interventions, address functional mobility deficits, address ROM deficits, address strength deficits, analyze and address soft tissue restrictions, analyze and cue movement patterns and instruct in home and community integration to attain remaining goals.    Progress toward goals / Updated goals:    Reassess for progress note next visit     PLAN  []  Upgrade activities as tolerated YES Continue plan of care   []  Discharge due to :    []  Other:      Therapist: Sky Moreno PTA    Date: 5/23/2019 Time: 12:12 pm     Future Appointments   Date Time Provider Christelle Perkins   5/23/2019 11:15 AM Miguelina Potter PTA [facility]

## 2019-05-28 ENCOUNTER — HOSPITAL ENCOUNTER (OUTPATIENT)
Dept: PHYSICAL THERAPY | Age: 54
Discharge: HOME | End: 2019-05-28
Payer: MEDICAID

## 2019-05-28 PROCEDURE — 97110 THERAPEUTIC EXERCISES: CPT

## 2019-05-28 PROCEDURE — 97014 ELECTRIC STIMULATION THERAPY: CPT

## 2019-05-28 NOTE — PROGRESS NOTES
[address]YSICAL THERAPY  [address] -   Phone: [phone]  Fax: [phone]  [x]  PROGRESS NOTE  []   [facility] SUMMARY  Patient Name: Jane Ziegler : 1965   Treatment Diagnosis: Lumbago [M54.5]     Referral Source: Maru Contreras MD     Date of Initial Visit: 3/27/2019 Attended Visits: 18 Missed Visits: 0     SUMMARY OF TREATMENT  Therapeutic exercises including ROM, strengthening, stretching, manual therapy including joint and soft tissue manipulation, core strengthening, postural re-education, modalities: ESTIM, and HEP instruction. CURRENT STATUS  The pt has progressed well with therapy, consistently reporting decreased pain and increased functional ability. Currently, the patient's main complaint is left lateral hip and lower shin pain and radicular sx. Average lumbar pain is rated at 5/10 and 7-8/10 at the worst. Trunk ROM is as follows: flexion 65%, extension 50%, R/L Side bending 75/65%, R/L Rotation 95/95%, and R/L SG 50/75%. Pt would benefit from continued PT services in order to improve L/S AROM, core strength, LE strength, flexibility, functional mobility and address remaining impairments. Goal/Measure of Progress Goal Met? 1.  Establish HEP to prevent further disability. Status at last Eval: Goal Established Current Status: I with HEP yes   2. Patient will increase L/S ROM in all directions to pain free and WNLs to increase ability to don and doff shoes.     Status at last Eval: Flexion = 65%   Extension = 50%   R/L Side bending = 60/65%  R/L Rotation = 95/100%  R/L SG = 50/75% Current Status: Flexion = 65% Extension = 50%   R/L Side bending = 75/65%  R/L Rotation = 95/100%  R/L SG = 50/75% Progressing   3. Patient will report centralization left LE radicular symptoms in order to facilitate ease with prolonged sitting.     Status at last Eval: Constant numbness and tingling radiating down the L LE (below the knee) into the great toe Current Status: Frequent L LE radicular sx into the left lateral shin no     New Goals to be achieved in __3-4__  Weeks:  1. Patient to be independent & compliant with progressive HEP in preparation for D/C.   2.  Increase FOTO to 48/100 indicating improved function and quality of life. 3.  Patient will report centralization left LE radicular symptoms in order to facilitate ease with prolonged sitting. 4.  Patient to perform 100% bridge indicating improved core strength to improve ambulation around the grocery store     RECOMMENDATIONS  Continue therapy with the following recommendations: 1-2x week for 2-3 weeks    If you have any questions/comments please contact us directly at [phone]   Thank you for allowing us to assist in the care of your patient. Therapist Signature: Adrienne Franco DPT Date: 5/28/2019     Time: 7:07 AM   NOTE TO PHYSICIAN:  PLEASE COMPLETE THE ORDERS BELOW AND FAX TO   [facility] Physical [facility]: [phone]  If you are unable to process this request in 24 hours please contact our office: [phone]    ___ I have read the above report and request that my patient continue as recommended.   ___ I have read the above report and request that my patient continue therapy with the following changes/special instructions:_________________________________________________________   ___ I have read the above report and request that my patient be discharged from therapy.      Physician Signature:        Date:       Time:

## 2019-05-30 ENCOUNTER — HOSPITAL ENCOUNTER (OUTPATIENT)
Dept: PHYSICAL THERAPY | Age: 54
Discharge: HOME | End: 2019-05-30
Payer: MEDICAID

## 2019-05-30 PROCEDURE — 97110 THERAPEUTIC EXERCISES: CPT

## 2019-05-30 PROCEDURE — 97014 ELECTRIC STIMULATION THERAPY: CPT

## 2019-05-30 NOTE — PROGRESS NOTES
PHYSICAL THERAPY - DAILY TREATMENT NOTE    Patient Name: William Stringer        Date: 2019  : 1965   YES Patient  Verified  Visit #:   18   Insurance: Payor: Lizy Grissom / Plan:  [email or web address] [facility] CCC[employer] / Product Type: Managed Care Medicaid /      In time: 8:18 Out time: 9:16   Total Treatment Time: 62     BCBS and Medicare Time Tracking (below)   Total Timed Codes (min):  58 1:1 Treatment Time:  58     TREATMENT AREA =  Lumbago [M54.5]    SUBJECTIVE  Pain Level (on 0 to 10 scale):  5  / 10   Medication Changes/New allergies or changes in medical history, any new surgeries or procedures? NO    If yes, update Summary List   Subjective Functional Status/Changes:  []  No changes reported     Patient states \"last Thursday I walked about a mile, its not all the way back to the beginning bc it's not in the toe but its in the left hip and down the leg. I did very little yesterday bc it was aggravating it\"          OBJECTIVE  Modality Modalities Rationale: decrease inflammation and decrease pain to improve patient's ability to perform ADLs. 10 min [x] Estim, type: Prone IFC low back in prone                                         []  att     []  unatt     []  w/US     [x]  w/ice    []  w/heat    min []  Mechanical Traction: type/lbs                                               []  pro   []  sup   []  int   []  cont    min []  Ultrasound, settings/location:      min []  Iontophoresis:  []  take home patch w/ dexamethazone    min                                []  in clinic w/ dexamethazone    min []  Ice     []  Heat     position:     min []  Other:      48 min Therapeutic Exercise:  [x]  See flow sheet   []  Other:      [x]  Added:  R SG   to improve (function):    []  Changed:     Rationale:  To increase ROM, flexibility, and increase strength to improve the patients ability to perform ADLs     min Patient Education:  YES  Reviewed HEP   []  Progressed/Changed HEP based on: Other Objective/Functional Measures:    L/S & LE MMT/AROM Pre Tx   Pain location/sx Moved from foot to upper L lat shin   Forward flexion  65%   Extension  NT   R/L SB 85%/75%   R/L Rotation 100%        Post Treatment Pain Level (on 0 to 10) scale:   4 / 10     ASSESSMENT  Assessment/Changes in Function:     Slight lateral left shift in standing with L LE sx from left posterior hip to top of left foot. S/P repeated R SG at wall brought sx from L foot to lateral left calf. Updated and issued HEP for repeated R SG. Education to limited standing with hips to the right.      []  See Progress Note/Recertification   Patient will continue to benefit from skilled PT services to modify and progress therapeutic interventions, address functional mobility deficits, address ROM deficits, address strength deficits, analyze and address soft tissue restrictions, analyze and cue movement patterns, analyze and modify body mechanics/ergonomics, assess and modify postural abnormalities, address imbalance/dizziness and instruct in home and community integration to attain remaining goals. Progress toward goals / Updated goals:    Progressing towards newly established LTGs. PLAN  []  Upgrade activities as tolerated YES Continue plan of care   []  Discharge due to :    []  Other:      Therapist: Holley Spears    Date: 5/30/2019 Time: 7:05 AM     No future appointments.

## 2019-07-01 NOTE — PROGRESS NOTES
[address] PHYSICAL THERAPY AT [address], [address], [address] - Phone: [phone]  Fax: [phone] SUMMARY  Patient Name: Luanne Keen : 1965   Treatment/Medical Diagnosis: Lumbago [M54.5]   Referral Source: Juancho Graham MD     Date of Initial Visit: 2019 Attended Visits: 19 Missed Visits: 0     SUMMARY OF TREATMENT  Therapeutic exercises including ROM, strengthening, stretching, manual therapy including joint and soft tissue manipulation, core strengthening, postural re-education, modalities: ESTIM, and HEP instruction      CURRENT STATUS  Patient was unable to be formally reassessed secondary to not returning to PT after 19 appointment. Goal/Measure of Progress Goal Met? 1. Patient to be independent & compliant with progressive HEP in preparation for D/C. Status at last Eval: I in initial HEP. Current Status: Unable to be formally reassessed. no   2. Increase FOTO to 48/100 indicating improved function and quality of life. Status at last Eval: 46 Current Status: Unable to be formally reassessed. no   3. Patient will report centralization left LE radicular symptoms in order to facilitate ease with prolonged sitting. Status at last Eval: Frequent L LE radicular sx into the left lateral shin Current Status: Unable to be formally reassessed. no   4. Patient to perform 100% bridge indicating improved core strength to improve ambulation around the grocery store   Status at last Eval: AROM supine bridge ~ 50%  Current Status: Unable to be formally reassessed. no         RECOMMENDATIONS  Discontinue therapy due to lack of attendance or compliance. If you have any questions/comments please contact us directly at [phone]. Thank you for allowing us to assist in the care of your patient. LPTA Signature: Kaylyn Dela Cruz Date: 2019   Therapist Signature:  Ita Donahue PT Time: 1:09 PM     To ensure we are able to process the patients encounter and avoid risk of your patient receiving a bill for our services, please sign and return this discharge summary.       Physician signature:__________________________   Date: _________                                                                                           Time:__________

## 2020-02-26 ENCOUNTER — HOSPITAL ENCOUNTER (OUTPATIENT)
Dept: GENERAL RADIOLOGY | Age: 55
Discharge: HOME | End: 2020-02-26
Attending: NURSE PRACTITIONER
Payer: COMMERCIAL

## 2020-02-26 DIAGNOSIS — M25.551 RIGHT HIP PAIN: ICD-10-CM

## 2020-02-26 PROCEDURE — 73552 X-RAY EXAM OF FEMUR 2/>: CPT

## 2020-02-26 PROCEDURE — 73502 X-RAY EXAM HIP UNI 2-3 VIEWS: CPT
